# Patient Record
Sex: FEMALE | Race: WHITE | Employment: UNEMPLOYED | URBAN - METROPOLITAN AREA
[De-identification: names, ages, dates, MRNs, and addresses within clinical notes are randomized per-mention and may not be internally consistent; named-entity substitution may affect disease eponyms.]

---

## 2021-02-15 ENCOUNTER — OFFICE VISIT (OUTPATIENT)
Dept: PRIMARY CARE CLINIC | Age: 51
End: 2021-02-15
Payer: MEDICAID

## 2021-02-15 VITALS
SYSTOLIC BLOOD PRESSURE: 105 MMHG | HEIGHT: 68 IN | TEMPERATURE: 98 F | BODY MASS INDEX: 35.34 KG/M2 | OXYGEN SATURATION: 99 % | WEIGHT: 233.2 LBS | HEART RATE: 78 BPM | DIASTOLIC BLOOD PRESSURE: 71 MMHG | RESPIRATION RATE: 16 BRPM

## 2021-02-15 DIAGNOSIS — I10 ESSENTIAL HYPERTENSION: ICD-10-CM

## 2021-02-15 DIAGNOSIS — Z23 NEED FOR SHINGLES VACCINE: ICD-10-CM

## 2021-02-15 DIAGNOSIS — Z12.11 SCREEN FOR COLON CANCER: ICD-10-CM

## 2021-02-15 DIAGNOSIS — Z01.419 WOMEN'S ANNUAL ROUTINE GYNECOLOGICAL EXAMINATION: ICD-10-CM

## 2021-02-15 DIAGNOSIS — Z12.11 COLON CANCER SCREENING: ICD-10-CM

## 2021-02-15 DIAGNOSIS — F31.9 BIPOLAR DISORDER WITH DEPRESSION (HCC): ICD-10-CM

## 2021-02-15 DIAGNOSIS — K21.9 GASTROESOPHAGEAL REFLUX DISEASE WITHOUT ESOPHAGITIS: ICD-10-CM

## 2021-02-15 DIAGNOSIS — M05.79 RHEUMATOID ARTHRITIS INVOLVING MULTIPLE SITES WITH POSITIVE RHEUMATOID FACTOR (HCC): Primary | ICD-10-CM

## 2021-02-15 PROCEDURE — 99204 OFFICE O/P NEW MOD 45 MIN: CPT | Performed by: NURSE PRACTITIONER

## 2021-02-15 RX ORDER — HYDROCHLOROTHIAZIDE 50 MG/1
50 TABLET ORAL DAILY
COMMUNITY
End: 2021-03-30 | Stop reason: SDUPTHER

## 2021-02-15 RX ORDER — ZOSTER VACCINE RECOMBINANT, ADJUVANTED 50 MCG/0.5
KIT INTRAMUSCULAR
Qty: 0.5 ML | Refills: 0 | Status: SHIPPED | OUTPATIENT
Start: 2021-02-15 | End: 2021-07-01

## 2021-02-15 RX ORDER — MELOXICAM 15 MG/1
15 TABLET ORAL DAILY
COMMUNITY
End: 2021-06-28

## 2021-02-15 RX ORDER — ARIPIPRAZOLE 10 MG/1
10 TABLET ORAL DAILY
COMMUNITY
End: 2021-03-23 | Stop reason: SDUPTHER

## 2021-02-15 RX ORDER — FAMOTIDINE 20 MG/1
20 TABLET, FILM COATED ORAL 2 TIMES DAILY
COMMUNITY
End: 2021-04-26 | Stop reason: SDUPTHER

## 2021-02-15 RX ORDER — PANTOPRAZOLE SODIUM 40 MG/1
40 TABLET, DELAYED RELEASE ORAL DAILY
Qty: 90 TAB | Refills: 1 | Status: SHIPPED | OUTPATIENT
Start: 2021-02-15 | End: 2021-06-28

## 2021-02-15 RX ORDER — GABAPENTIN 600 MG/1
600 TABLET ORAL 3 TIMES DAILY
COMMUNITY
End: 2021-05-14 | Stop reason: SDUPTHER

## 2021-02-15 RX ORDER — VENLAFAXINE HYDROCHLORIDE 150 MG/1
150 TABLET, EXTENDED RELEASE ORAL DAILY
COMMUNITY
End: 2021-03-23 | Stop reason: SDUPTHER

## 2021-02-15 RX ORDER — LAMOTRIGINE 200 MG/1
TABLET ORAL DAILY
COMMUNITY
End: 2021-03-23 | Stop reason: SDUPTHER

## 2021-02-15 RX ORDER — DEXTROAMPHETAMINE SACCHARATE, AMPHETAMINE ASPARTATE, DEXTROAMPHETAMINE SULFATE AND AMPHETAMINE SULFATE 5; 5; 5; 5 MG/1; MG/1; MG/1; MG/1
30 TABLET ORAL 2 TIMES DAILY
COMMUNITY

## 2021-02-15 RX ORDER — TRAZODONE HYDROCHLORIDE 150 MG/1
150 TABLET ORAL
COMMUNITY
End: 2021-04-26 | Stop reason: SDUPTHER

## 2021-02-15 RX ORDER — LEFLUNOMIDE 20 MG/1
TABLET ORAL DAILY
COMMUNITY
End: 2021-02-16 | Stop reason: ALTCHOICE

## 2021-02-15 RX ORDER — PROPRANOLOL HYDROCHLORIDE 10 MG/1
10 TABLET ORAL 3 TIMES DAILY
COMMUNITY
End: 2021-04-26 | Stop reason: SDUPTHER

## 2021-02-15 RX ORDER — BUPROPION HYDROCHLORIDE 200 MG/1
200 TABLET, EXTENDED RELEASE ORAL 2 TIMES DAILY
COMMUNITY
End: 2021-03-23 | Stop reason: SDUPTHER

## 2021-02-15 RX ORDER — PANTOPRAZOLE SODIUM 40 MG/1
40 TABLET, DELAYED RELEASE ORAL DAILY
COMMUNITY
End: 2021-02-15 | Stop reason: SDUPTHER

## 2021-02-15 NOTE — PROGRESS NOTES
[facility]   [address] [facility], [address] [place] [address]  P: [phone]  F: [phone]      TERESSA Alicea is a 48 y.o. female who presents to clinic for Establish Care, will be obtaining prior records for full PMH, but briefly has a history of hypertension, rheumatoid arthritis, bipolar disorder with anxiety and panic attacks, PTSD. Presents today as she recently moved from [place] to [place]. She states she has an upcoming appointment with rheumatology Dr. Shari Solorzano, but would like to expedite that appointment as she continues to have significant joint pains to her hands, elbows, knees, and sometimes ankles. She had a rheumatologist in [place] that was about to start her on Evelia, she is interested in discussing that further. She failed treatment with methotrexate previously due to side effects. Of note, she does use marijuana which helps with her joint pains. For mental health, reports things are stable on current medication regimen. This includes Lamictal, Abilify, gabapentin, trazodone, Wellbutrin, and Effexor. She does not currently have a psychiatrist set up locally. Has a prior traumatic event of her  who committed suicide, which she mentions during her office visit today. For hypertension, continues on hydrochlorothiazide and propranolol. BP in office today is 105/71. There are no active problems to display for this patient.      Past Medical History:   Diagnosis Date    Anxiety     Bipolar affect, depressed ([address]     History of rheumatoid arthritis     PTSD (post-traumatic stress disorder)      Past Surgical History:   Procedure Laterality Date    HX CHOLECYSTECTOMY  2004    gallbladder taken out    HX GYN  1997    complete hysterectomy     Social History     Socioeconomic History    Marital status: SINGLE     Spouse name: Not on file    Number of children: Not on file    Years of education: Not on file    Highest education level: Not on file   Occupational History    Not on file   Social Needs    Financial resource strain: Not on file    Food insecurity     Worry: Not on file     Inability: Not on file    Transportation needs     Medical: Not on file     Non-medical: Not on file   Tobacco Use    Smoking status: Former Smoker     Quit date:      Years since quittin.1    Smokeless tobacco: Never Used   Substance and Sexual Activity    Alcohol use: Yes     Comment: occasionally    Drug use: Yes     Types: Marijuana     Comment: medical marijuana license in [place]/ helps with her hurting so back and panic attacks    Sexual activity: Not on file   Lifestyle    Physical activity     Days per week: Not on file     Minutes per session: Not on file    Stress: Not on file   Relationships    Social connections     Talks on phone: Not on file     Gets together: Not on file     Attends Synagogue service: Not on file     Active member of club or organization: Not on file     Attends meetings of clubs or organizations: Not on file     Relationship status: Not on file    Intimate partner violence     Fear of current or ex partner: Not on file     Emotionally abused: Not on file     Physically abused: Not on file     Forced sexual activity: Not on file   Other Topics Concern    Not on file   Social History Narrative    Not on file     Family History   Problem Relation Age of Onset    Osteoporosis Mother    [facility] Arthritis Mother     Dementia Mother     Psychiatric Disorder Mother     Diabetes Father     Heart Disease Father     Cancer Father     Other Father      Allergies   Allergen Reactions    Methotrexate Angioedema    Percocet [Oxycodone-Acetaminophen] Other (comments)     Gives her the \"creepy crawlies\"        Current Outpatient Medications   Medication Sig Dispense Refill    propranoloL (INDERAL) 10 mg tablet Take 10 mg by mouth three (3) times daily.       hydroCHLOROthiazide (HYDRODIURIL) 50 mg tablet Take 50 mg by mouth daily.      Venlafaxine-ER 24 HR (EFFEXOR-ER) 150 mg tr24 tablet Take 150 mg by mouth daily.  buPROPion SR (WELLBUTRIN, ZYBAN) 200 mg SR tablet Take 200 mg by mouth two (2) times a day.  meloxicam (MOBIC) 15 mg tablet Take 15 mg by mouth daily.  famotidine (PEPCID) 20 mg tablet Take 20 mg by mouth two (2) times a day.  vit J-A2-D0-folic-B12-ALA-Q10 15 UEAL-56 mg- 12.5 mg-1 mg cap Take 1 mg by mouth.  traZODone (DESYREL) 150 mg tablet Take 150 mg by mouth nightly.  gabapentin (NEURONTIN) 600 mg tablet Take 600 mg by mouth three (3) times daily.  ARIPiprazole (ABILIFY) 10 mg tablet Take 10 mg by mouth daily.  lamoTRIgine (LaMICtal) 200 mg tablet Take  by mouth daily.  dextroamphetamine-amphetamine (ADDERALL) 20 mg tablet Take 20 mg by mouth two (2) times a day.  pantoprazole (PROTONIX) 40 mg tablet Take 1 Tab by mouth daily. 90 Tab 1    varicella-zoster recombinant, PF, (Shingrix, PF,) 50 mcg/0.5 mL susr injection 1 shot IM now, 2nd shot in 2-6 months. 0.5 mL 0           The medications were reviewed and updated in the medical record. The past medical history, past surgical history, and family history were reviewed and updated in the medical record. REVIEW OF SYSTEMS   Review of Systems   Constitutional: Negative for malaise/fatigue. HENT: Negative for congestion. Eyes: Negative for blurred vision and pain. Respiratory: Negative for cough and shortness of breath. Cardiovascular: Negative for chest pain and palpitations. Gastrointestinal: Negative for abdominal pain and heartburn. Genitourinary: Negative for frequency and urgency. Musculoskeletal: Positive for joint pain. Negative for myalgias. Neurological: Negative for dizziness, tingling, sensory change, weakness and headaches. Psychiatric/Behavioral: Negative for depression, memory loss and substance abuse.          PHYSICAL EXAM     Visit Vitals  /71 (BP 1 Location: Left upper arm, BP Patient Position: Sitting, BP Cuff Size: Large adult)   Pulse 78   Temp 98 °F (36.7 °C) (Temporal)   Resp 16   Ht 5' 8\" (1.727 m)   Wt 233 lb 3.2 oz (105.8 kg)   SpO2 99%   BMI 35.46 kg/m²       Physical Exam  Vitals signs and nursing note reviewed. HENT:      Head: Normocephalic and atraumatic. Right Ear: External ear normal.      Left Ear: External ear normal.   Cardiovascular:      Rate and Rhythm: Normal rate and regular rhythm. Heart sounds: Normal heart sounds, S1 normal and S2 normal. No murmur. No friction rub. No gallop. Pulmonary:      Effort: Pulmonary effort is normal.      Breath sounds: Normal breath sounds. Musculoskeletal: Normal range of motion. Comments: + Multiple chronic joint pains- ankles, elbows, knees, hands    Skin:     General: Skin is warm and dry. Comments: + Multiple tattoos    Neurological:      Mental Status: She is alert and oriented to person, place, and time. Gait: Gait is intact. Psychiatric:         Mood and Affect: Affect is flat. Judgment: Judgment normal.         ASSESSMENT/ PLAN   Diagnoses and all orders for this visit:    1. Rheumatoid arthritis involving multiple sites with positive rheumatoid factor ([facility]  -     REFERRAL TO RHEUMATOLOGY  -Interested in [place], I will defer to rheumatology to further discuss. 2.  Essential hypertension  -BP stable in office, no changes today. Request recent lab work CMP and TSH. 3. Bipolar disorder with depression ([facility]       -Advised her to see what psychiatry resources are available to her through Medicaid. 4. Gastroesophageal reflux disease without esophagitis  -     pantoprazole (PROTONIX) 40 mg tablet; Take 1 Tab by mouth daily. 5. Screen for colon cancer  -     [facility] 3D MARLA W MAMMO BI SCREENING INCL CAD; Future    6. Women's annual routine gynecological examination  -     REFERRAL TO OBSTETRICS AND GYNECOLOGY    7. Colon cancer screening  -     REFERRAL TO GASTROENTEROLOGY    8. Need for shingles vaccine  -     varicella-zoster recombinant, PF, (Shingrix, PF,) 50 mcg/0.5 mL susr injection; 1 shot IM now, 2nd shot in 2-6 months. Disclaimer:  Advised patient to call back or return to office if symptoms worsen/change/persist.  Discussed expected course/resolution/complications of diagnosis in detail with patient.     Medication risks/benefits/alternatives discussed with patient. Patient was given an after visit summary which includes diagnoses, current medications, & vitals.      Discussed patient instructions and advised to read to all patient instructions regarding care.      Patient expressed understanding with the diagnosis and plan. This note will not be viewable in [address]         Maco Claros NP  2/15/2021        (This document has been electronically signed)

## 2021-02-15 NOTE — PROGRESS NOTES
She saw a Behavioral Specialist back in [place] and needs to re-establish care with someone here. Chief Complaint   Patient presents with   [facility] [place] [facility]     and patient recently moved here from [place], recently diagnosed with Rheumatoid Arthritis. 1. Have you been to the ER, urgent care clinic since your last visit? Hospitalized since your last visit? No    2. Have you seen or consulted any other health care providers outside of the [address] [place] [address] since your last visit? Include any pap smears or colon screening.  No

## 2021-02-16 ENCOUNTER — TELEPHONE (OUTPATIENT)
Dept: RHEUMATOLOGY | Age: 51
End: 2021-02-16

## 2021-02-16 NOTE — TELEPHONE ENCOUNTER
----- Message from Destiny Garcia MD sent at 2/16/2021  3:21 PM EST -----  Happy to take a look. Reid Harley, can we fit into a Tuesday afternoon within the next 2 weeks? Thanks,  Vivianne Spatz  ----- Message -----  From: Arlyn Andujar NP  Sent: 2/16/2021   8:28 AM EST  To: Destiny Garcia MD    Good morning,     I have a new patient with multiple joint complaints, known RA. Can you expedite her appointment if possible.  Thanks,     Willie Hallman

## 2021-02-23 ENCOUNTER — OFFICE VISIT (OUTPATIENT)
Dept: RHEUMATOLOGY | Age: 51
End: 2021-02-23
Payer: MEDICAID

## 2021-02-23 VITALS
HEIGHT: 68 IN | SYSTOLIC BLOOD PRESSURE: 120 MMHG | DIASTOLIC BLOOD PRESSURE: 69 MMHG | WEIGHT: 232.2 LBS | BODY MASS INDEX: 35.19 KG/M2 | TEMPERATURE: 97.7 F | OXYGEN SATURATION: 97 % | RESPIRATION RATE: 18 BRPM | HEART RATE: 74 BPM

## 2021-02-23 DIAGNOSIS — Z15.89 HLA B27 (HLA B27 POSITIVE): ICD-10-CM

## 2021-02-23 DIAGNOSIS — Z79.899 ONGOING USE OF POSSIBLY TOXIC MEDICATION: ICD-10-CM

## 2021-02-23 DIAGNOSIS — M47.816 LUMBAR FACET ARTHROPATHY: ICD-10-CM

## 2021-02-23 DIAGNOSIS — M06.00 SERONEGATIVE RHEUMATOID ARTHRITIS (HCC): Primary | ICD-10-CM

## 2021-02-23 PROCEDURE — 99205 OFFICE O/P NEW HI 60 MIN: CPT | Performed by: INTERNAL MEDICINE

## 2021-02-23 RX ORDER — FOLIC ACID 1 MG/1
TABLET ORAL DAILY
COMMUNITY
End: 2021-03-23 | Stop reason: SDUPTHER

## 2021-02-23 RX ORDER — LEFLUNOMIDE 20 MG/1
20 TABLET ORAL DAILY
Qty: 30 TAB | Refills: 5 | Status: SHIPPED | OUTPATIENT
Start: 2021-02-23 | End: 2022-01-10 | Stop reason: SDUPTHER

## 2021-02-23 RX ORDER — DICLOFENAC SODIUM 75 MG/1
75 TABLET, DELAYED RELEASE ORAL
Qty: 60 TAB | Refills: 3 | Status: SHIPPED | OUTPATIENT
Start: 2021-02-23 | End: 2021-03-25

## 2021-02-23 NOTE — PATIENT INSTRUCTIONS
1. Labs at your earliest convenience, any [facility] or [facility]. 2. Xrays, take the orders to [facility], you don't need an appointment for these. 3. Start leflunomide 1 pill daily. If you experience worsening tingling/numbness, let me know. 4. Talk to your PCP about restarting your B12--you may be able to just start taking a daily pill supplement now that you've had 3 months of injections. 5. Try diclofenac one pill up to twice a day as needed for joint pain. Don't take this on days you take meloxicam, as they work the same way. 6. You can also take up to 3000mg of tylenol in the day spaced out in doses <= 1000mg at a time for additional pain control. 7. Send me a message after you get the labs and xrays done, and we can decide if we need to follow up with a hand or pelvis MRI. 8. Return in 6 weeks.

## 2021-02-23 NOTE — PROGRESS NOTES
REASON FOR VISIT:   Lay Siddiqi is a 48 y.o. female with history of depression, PTSD, gout, and hypertension who is being referred to [address] [place] [address] Rheumatology at the request of Dr. Dori Carrero, regarding a diagnosis of joint pain. HISTORY OF PRESENT ILLNESS  History obtained from patient, review of PCP records, and review of Dr. Elza Guzman Rheumatology records. About 15 years ago, had an episode of acute onset right foot swelling and pain, couldn't walk or bear weight, lasted about 5 days. Uric acid she remembers being \"very high\", remembers taking allopurinol for about a year before she feels it fell off of her med list and was not continued. Recalls was drinking more regularly, and smoking then; she remains on hydrochlorothiazide. No acute flares since then. For over a year, she has had worsening hand pain. Also elbows, sometimes either knee. Swell and ache all day long. Pains are worst during the day while active, eg writing or typing. She says PCP in [place] found elevated RF (though normal from available notes), referred to Dr. Elieser Sanchez in Rheumatology but was only able to see him virtually twice, never in-person. Available labs showed +HLA-B27, negative RF and CCP. Started methotrexate around November 2021, developed lingual ulcers and tongue swelling, stopped after single dose and treated with leucovorin. Then prescribed leflunomide, but was as she was moving and her new insurance didn't accept the old prescription. She has had distal digital paresthesias worse in the morning for over a year. Initial B12 level July 2020 was 183 [], for which started monthly IM injections x 3 then was lost to followup with [place] PCP. Endorses about 1 hour morning stiffness, and prominent gelling. Joint pains are worse in the cold. Now, she takes meloxicam as needed for joint pain, which she says helps 50%.  Has been on gabapentin 600mg at night for 5-6 years which she mostly uses for mood and sleep. Pain in the hands often wakes her up, estimates she gets 5 hours of sleep per night. Chronic low back and left > right hip pain, no emmanuel radicular character. No rashes. No dyspnea on exertion, no chronic cough. Vision is gradually worsening, but no extended redness or pain. REVIEW OF SYSTEMS  A comprehensive review of systems was negative except for that written in the HPI. A 10-point review of systems is per the new patient questionnaire, which has been reviewed extensively and scanned into the electronic medical record for future reference. Review of systems is as above and is otherwise negative. ALLERGIES  Methotrexate and Percocet [oxycodone-acetaminophen]    MEDICATIONS  Current Outpatient Medications   Medication Sig    folic acid (FOLVITE) 1 mg tablet Take  by mouth daily.  propranoloL (INDERAL) 10 mg tablet Take 10 mg by mouth three (3) times daily.  hydroCHLOROthiazide (HYDRODIURIL) 50 mg tablet Take 50 mg by mouth daily.  Venlafaxine-ER 24 HR (EFFEXOR-ER) 150 mg tr24 tablet Take 150 mg by mouth daily.  buPROPion SR (WELLBUTRIN, ZYBAN) 200 mg SR tablet Take 200 mg by mouth two (2) times a day.  meloxicam (MOBIC) 15 mg tablet Take 15 mg by mouth daily.  famotidine (PEPCID) 20 mg tablet Take 20 mg by mouth two (2) times a day.  traZODone (DESYREL) 150 mg tablet Take 150 mg by mouth nightly.  gabapentin (NEURONTIN) 600 mg tablet Take 600 mg by mouth three (3) times daily.  ARIPiprazole (ABILIFY) 10 mg tablet Take 10 mg by mouth daily.  lamoTRIgine (LaMICtal) 200 mg tablet Take  by mouth daily.  dextroamphetamine-amphetamine (ADDERALL) 20 mg tablet Take 20 mg by mouth two (2) times a day.  pantoprazole (PROTONIX) 40 mg tablet Take 1 Tab by mouth daily.  vit M-W9-G2-folic-B12-ALA-Q10 15 LVHM-84 mg- 12.5 mg-1 mg cap Take 1 mg by mouth.     varicella-zoster recombinant, PF, (Shingrix, PF,) 50 mcg/0.5 mL susr injection 1 shot IM now, 2nd shot in 2-6 months. No current facility-administered medications for this visit. PAST MEDICAL HISTORY  Past Medical History:   Diagnosis Date    Anxiety     Bipolar affect, depressed ([address]     History of rheumatoid arthritis     PTSD (post-traumatic stress disorder)        FAMILY HISTORY  family history includes Arthritis in her mother; Arthritis-rheumatoid in her maternal aunt, mother, and sister; Cancer in her father; Dementia in her mother; Diabetes in her father; Gout in her maternal grandmother; Heart Disease in her father; Lupus in her maternal uncle; Osteoporosis in her mother; Other in her father; Psychiatric Disorder in her mother. SOCIAL HISTORY  She  reports that she quit smoking about 13 months ago. She has never used smokeless tobacco. She reports current alcohol use. She reports current drug use. Drug: Marijuana. 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 Born and raised in [place]. Son lives in [place]. Drinks a glass of wine once or twice a month. Quit smoking after 1/2 PPD x35yr. DATA  Visit Vitals  Temp 97.7 °F (36.5 °C)   Ht 5' 8\" (1.727 m)   Wt 232 lb 3.2 oz (105.3 kg)   BMI 35.31 kg/m²    Body mass index is 35.31 kg/m². No flowsheet data found. Joint Count 2/23/2021   MHAQ 0.75   Left wrist- Tender 1   Left wrist- Swollen 0   Left 1st MCP - Tender 1   Left 1st MCP - Swollen 0   Left knee - Tender 1   Left knee - Swollen 0   Right 2nd PIP - Tender 1   Right 2nd PIP - Swollen 0   Tender Joint Count (Total) 4   Swollen Joint Count (Total) 0   Physician Assessment (0-10) 2   Patient Assessment (0-10) 6   CDAI Total (calculated) 12       General:  The patient is pleasant, obese, well nourished, alert, down-appearing, but in no apparent distress. Eyes: Sclera are anicteric. No conjunctival injection. HEENT:  Oropharynx is clear. No oral ulcers.  Adequate salivary pooling. No cervical or supraclavicular lymphadenopathy. Lungs:  Clear to auscultation bilaterally, without wheeze or stridor. Normal respiratory effort. Cor:  Regular rate and rhythm. No murmurs rubs or gallops. Abdomen: Sities: No calf tenderness or edema. Warm and well perfused. Skin:  No significant abnormalitiesoft, non-tender, without hepatomegaly or masses. Extrem. No petechial, purpuric, or psoriaform rashes. Normal nails. No sclerodactyly. Neuro: Nonfocal, no foot or wrist drop. Negative SLR bilaterally. Musculoskeletal:    A comprehensive musculoskeletal exam was performed for all joints of each upper and lower extremity and assessed for swelling, tenderness and range of motion. Results are documented as below:  Left wrist, left 1st MCP right index PIP tender without marked synovitis. Mild extensor retinaculum prominence over right mid-foot, no synovitis. No evidence of synovitis in the shoulders, elbows, hips, knees or ankles. mShober 15->20cm    Labs:  12/17/20: ESR 19, CMP WNL (Cr 098), CBC WNL, uric acid 7.0  7/8/20: Uric acid 7.5, CCP negative, RF negative, B12 183, SHERON negative, HLA-B27+        Imaging:  None available      ASSESSMENT AND PLAN  Ms. Quirino Nicolas is a 48 y.o. female who presents for evaluation of HLA-B27+ seronegative rheumatoid arthritis with inflammatory-character athralgias and by CDAI moderate disease activity. Intolerance/allergy to methotrexate, reasonable to pursue leflunomide next given nationwide sulfasalazine shortage. Her low back pain has more degenerative character but will screen also for e/o ankylosing spondylitis given HLA-B27 positivity. Reviewed risk/benefit of further prednisone and she prefers to continue with prn NSAIDs while starting leflunomide. 1. Seronegative rheumatoid arthritis (HCC)  - C REACTIVE PROTEIN, QT; Standing  - CBC WITH AUTOMATED DIFF; Standing  - METABOLIC PANEL, COMPREHENSIVE; Standing  - SED RATE (ESR);  Standing  - CHRONIC HEPATITIS PANEL; Future  - QUANTIFERON-TB GOLD PLUS; Future  - XR HAND RT MIN 3 V; Future  - XR HAND LT MIN 3 V; Future  - XR KNEE RT 3 V; Future  - XR KNEE LT 3 V; Future  - XR ELBOW LT MIN 3 V; Future  - XR ELBOW RT MIN 3 V; Future  - XR FOOT RT MIN 3 V; Future  - XR FOOT LT MIN 3 V; Future  - XR SPINE LUMB MIN 4 V; Future  - XR HIPS BI W PELV 3 OR 4 VWS; Future  - diclofenac EC (VOLTAREN) 75 mg EC tablet; Take 1 Tab by mouth two (2) times daily as needed for Pain for up to 30 days. Dispense: 60 Tab; Refill: 3  - leflunomide (ARAVA) 20 mg tablet; Take 1 Tab by mouth daily. Take half tab daily for 7 days and then one tab daily if tolerated  Dispense: 30 Tab; Refill: 5    2. Ongoing use of possibly toxic medication  - C REACTIVE PROTEIN, QT; Standing  - CBC WITH AUTOMATED DIFF; Standing  - METABOLIC PANEL, COMPREHENSIVE; Standing  - SED RATE (ESR); Standing  - CHRONIC HEPATITIS PANEL; Future  - QUANTIFERON-TB GOLD PLUS; Future    3. HLA B27 (HLA B27 positive)  - XR HAND RT MIN 3 V; Future  - XR HAND LT MIN 3 V; Future  - XR KNEE RT 3 V; Future  - XR KNEE LT 3 V; Future  - XR ELBOW LT MIN 3 V; Future  - XR ELBOW RT MIN 3 V; Future  - XR FOOT RT MIN 3 V; Future  - XR FOOT LT MIN 3 V; Future  - XR SPINE LUMB MIN 4 V; Future  - XR HIPS BI W PELV 3 OR 4 VWS; Future    4. Lumbar facet arthropathy  - XR SPINE LUMB MIN 4 V; Future  - XR HIPS BI W PELV 3 OR 4 VWS; Future  - diclofenac EC (VOLTAREN) 75 mg EC tablet; Take 1 Tab by mouth two (2) times daily as needed for Pain for up to 30 days. Dispense: 60 Tab; Refill: 3  - leflunomide (ARAVA) 20 mg tablet; Take 1 Tab by mouth daily. Take half tab daily for 7 days and then one tab daily if tolerated  Dispense: 30 Tab; Refill: 5    5.  Vitamin B12 deficiency  -Encouraged her to review starting oral B12 supplementation with her PCP      Orders Placed This Encounter    QUANTIFERON-TB GOLD PLUS    XR HAND RT MIN 3 V    XR HAND LT MIN 3 V    XR KNEE RT 3 V    XR KNEE LT 3 V  XR ELBOW LT MIN 3 V    XR ELBOW RT MIN 3 V    XR FOOT RT MIN 3 V    XR FOOT LT MIN 3 V    XR SPINE LUMB MIN 4 V    XR HIPS BI W PELV 3 OR 4 VWS    C REACTIVE PROTEIN, QT    CBC WITH AUTOMATED DIFF    METABOLIC PANEL, COMPREHENSIVE    SED RATE (ESR)    CHRONIC HEPATITIS PANEL    folic acid (FOLVITE) 1 mg tablet    diclofenac EC (VOLTAREN) 75 mg EC tablet    leflunomide (ARAVA) 20 mg tablet       Medications: I am having Kimberlyn Yo start on diclofenac EC and leflunomide. I am also having her maintain her propranoloL, hydroCHLOROthiazide, Venlafaxine-ER 24 HR, buPROPion SR, meloxicam, famotidine, vit E-D0-O4-folic-B12-ALA-Q10, traZODone, gabapentin, ARIPiprazole, lamoTRIgine, dextroamphetamine-amphetamine, pantoprazole, Shingrix (PF), and folic acid. Follow up: Return in about 6 weeks (around 4/6/2021).      Time in: 1:23  Time out: 2:25  Chart review and note preparation day of visit: 10 minutes  Total physician time day of service: 68 minutes    Angel Medina MD    Adult Rheumatology   [address], [address] [place] [address]   Phone [phone]  Fax [phone]

## 2021-02-23 NOTE — LETTER
2/23/2021 Patient: Remy Roldan YOB: 1970 Date of Visit: 2/23/2021 Jene Cooks, NP 
[address] [place] [address] [address] [facility] [address] [id number] [address] [employer] Dear Jene Cooks, NP, Thank you for referring Ms. Remy Roldan to [address] [place] [address] for evaluation. My notes for this consultation are attached. If you have questions, please do not hesitate to call me. I look forward to following your patient along with you.  
 
 
Sincerely, 
 
Jenn Linn MD 
 
 Awake

## 2021-03-04 ENCOUNTER — HOSPITAL ENCOUNTER (OUTPATIENT)
Dept: GENERAL RADIOLOGY | Age: 51
Discharge: HOME | End: 2021-03-04
Payer: COMMERCIAL

## 2021-03-04 DIAGNOSIS — M06.00 SERONEGATIVE RHEUMATOID ARTHRITIS (HCC): ICD-10-CM

## 2021-03-04 DIAGNOSIS — Z15.89 HLA B27 (HLA B27 POSITIVE): ICD-10-CM

## 2021-03-04 DIAGNOSIS — M47.816 LUMBAR FACET ARTHROPATHY: ICD-10-CM

## 2021-03-04 DIAGNOSIS — E53.8 B12 DEFICIENCY: Primary | ICD-10-CM

## 2021-03-04 PROCEDURE — 73562 X-RAY EXAM OF KNEE 3: CPT

## 2021-03-04 PROCEDURE — 73080 X-RAY EXAM OF ELBOW: CPT

## 2021-03-04 PROCEDURE — 73522 X-RAY EXAM HIPS BI 3-4 VIEWS: CPT

## 2021-03-04 PROCEDURE — 73130 X-RAY EXAM OF HAND: CPT

## 2021-03-04 PROCEDURE — 73630 X-RAY EXAM OF FOOT: CPT

## 2021-03-04 PROCEDURE — 72110 X-RAY EXAM L-2 SPINE 4/>VWS: CPT

## 2021-03-07 LAB
ALBUMIN SERPL-MCNC: 3.7 G/DL (ref 3.8–4.8)
ALBUMIN/GLOB SERPL: 1.5 {RATIO} (ref 1.2–2.2)
ALP SERPL-CCNC: 96 IU/L (ref 39–117)
ALT SERPL-CCNC: 11 IU/L (ref 0–32)
AST SERPL-CCNC: 10 IU/L (ref 0–40)
BASOPHILS # BLD AUTO: 0.1 X10E3/UL (ref 0–0.2)
BASOPHILS NFR BLD AUTO: 1 %
BILIRUB SERPL-MCNC: <0.2 MG/DL (ref 0–1.2)
BUN SERPL-MCNC: 17 MG/DL (ref 6–24)
BUN/CREAT SERPL: 28 (ref 9–23)
CALCIUM SERPL-MCNC: 8.9 MG/DL (ref 8.7–10.2)
CHLORIDE SERPL-SCNC: 103 MMOL/L (ref 96–106)
CO2 SERPL-SCNC: 25 MMOL/L (ref 20–29)
COMMENT, 144067: NORMAL
CREAT SERPL-MCNC: 0.6 MG/DL (ref 0.57–1)
CRP SERPL-MCNC: 10 MG/L (ref 0–10)
EOSINOPHIL # BLD AUTO: 0.2 X10E3/UL (ref 0–0.4)
EOSINOPHIL NFR BLD AUTO: 3 %
ERYTHROCYTE [DISTWIDTH] IN BLOOD BY AUTOMATED COUNT: 12.4 % (ref 11.7–15.4)
ERYTHROCYTE [SEDIMENTATION RATE] IN BLOOD BY WESTERGREN METHOD: 18 MM/HR (ref 0–40)
GAMMA INTERFERON BACKGROUND BLD IA-ACNC: 0.03 IU/ML
GLOBULIN SER CALC-MCNC: 2.4 G/DL (ref 1.5–4.5)
GLUCOSE SERPL-MCNC: 144 MG/DL (ref 65–99)
HBV CORE AB SERPL QL IA: NEGATIVE
HBV CORE IGM SERPL QL IA: NEGATIVE
HBV E AB SERPL QL IA: NEGATIVE
HBV E AG SERPL QL IA: NEGATIVE
HBV SURFACE AB SER QL: REACTIVE
HBV SURFACE AG SERPL QL IA: NEGATIVE
HCT VFR BLD AUTO: 39 % (ref 34–46.6)
HCV AB S/CO SERPL IA: <0.1 S/CO RATIO (ref 0–0.9)
HGB BLD-MCNC: 12.6 G/DL (ref 11.1–15.9)
IMM GRANULOCYTES # BLD AUTO: 0 X10E3/UL (ref 0–0.1)
IMM GRANULOCYTES NFR BLD AUTO: 0 %
LYMPHOCYTES # BLD AUTO: 1.5 X10E3/UL (ref 0.7–3.1)
LYMPHOCYTES NFR BLD AUTO: 23 %
M TB IFN-G BLD-IMP: NEGATIVE
M TB IFN-G CD4+ BCKGRND COR BLD-ACNC: 0.02 IU/ML
MCH RBC QN AUTO: 29 PG (ref 26.6–33)
MCHC RBC AUTO-ENTMCNC: 32.3 G/DL (ref 31.5–35.7)
MCV RBC AUTO: 90 FL (ref 79–97)
MITOGEN IGNF BLD-ACNC: >10 IU/ML
MONOCYTES # BLD AUTO: 0.4 X10E3/UL (ref 0.1–0.9)
MONOCYTES NFR BLD AUTO: 6 %
NEUTROPHILS # BLD AUTO: 4.2 X10E3/UL (ref 1.4–7)
NEUTROPHILS NFR BLD AUTO: 67 %
PLATELET # BLD AUTO: 301 X10E3/UL (ref 150–450)
POTASSIUM SERPL-SCNC: 4.1 MMOL/L (ref 3.5–5.2)
PROT SERPL-MCNC: 6.1 G/DL (ref 6–8.5)
QUANTIFERON INCUBATION, QF1T: NORMAL
QUANTIFERON TB2 AG: 0.02 IU/ML
RBC # BLD AUTO: 4.34 X10E6/UL (ref 3.77–5.28)
SERVICE CMNT-IMP: NORMAL
SODIUM SERPL-SCNC: 139 MMOL/L (ref 134–144)
WBC # BLD AUTO: 6.3 X10E3/UL (ref 3.4–10.8)

## 2021-03-26 ENCOUNTER — TELEPHONE (OUTPATIENT)
Dept: RHEUMATOLOGY | Age: 51
End: 2021-03-26

## 2021-03-26 NOTE — TELEPHONE ENCOUNTER
----- Message from Pinky Nation LPN sent at 2/61/2009  1:43 PM EDT -----  Regarding: FW: Non-Urgent Medical Question  Contact: [phone]    ----- Message -----  From: Kera Patel  Sent: 3/26/2021   1:20 PM EDT  To: [facility] Nurse Pool  Subject: Non-Urgent Medical Question                      I'm unable to have my apt on the 30th can I please reschedule

## 2021-03-26 NOTE — TELEPHONE ENCOUNTER
Return call to patient due to a my chart message stating patient is unable to make her March the 30th appt left message on voice mail for pt to call back into the office to reschedule sdh

## 2021-03-30 DIAGNOSIS — I10 ESSENTIAL HYPERTENSION: Primary | ICD-10-CM

## 2021-03-30 RX ORDER — HYDROCHLOROTHIAZIDE 50 MG/1
50 TABLET ORAL DAILY
Qty: 90 TAB | Refills: 1 | Status: SHIPPED | OUTPATIENT
Start: 2021-03-30 | End: 2022-01-04 | Stop reason: ALTCHOICE

## 2021-04-07 ENCOUNTER — TELEPHONE (OUTPATIENT)
Dept: RHEUMATOLOGY | Age: 51
End: 2021-04-07

## 2021-04-07 ENCOUNTER — OFFICE VISIT (OUTPATIENT)
Dept: RHEUMATOLOGY | Age: 51
End: 2021-04-07
Payer: COMMERCIAL

## 2021-04-07 VITALS
TEMPERATURE: 97.1 F | DIASTOLIC BLOOD PRESSURE: 67 MMHG | WEIGHT: 232.8 LBS | HEIGHT: 68 IN | OXYGEN SATURATION: 96 % | HEART RATE: 74 BPM | RESPIRATION RATE: 20 BRPM | BODY MASS INDEX: 35.28 KG/M2 | SYSTOLIC BLOOD PRESSURE: 121 MMHG

## 2021-04-07 DIAGNOSIS — Z79.899 ONGOING USE OF POSSIBLY TOXIC MEDICATION: ICD-10-CM

## 2021-04-07 DIAGNOSIS — M13.80 SERONEGATIVE ARTHRITIS: Primary | ICD-10-CM

## 2021-04-07 PROCEDURE — 99214 OFFICE O/P EST MOD 30 MIN: CPT | Performed by: INTERNAL MEDICINE

## 2021-04-07 NOTE — PATIENT INSTRUCTIONS
1. Joints look good today. The xrays suggest no advanced bony damage; you do have some narrowing in your low back. 2. Continue leflunomide daily and diclofenac twice a day as needed. You're welcome to overlap the diclofenac with tylenol (650-1000mg up to 3x/day) to help reduce the diclofenac dependence. 3. Repeat labs before next visit. 4. Return in 3 months.

## 2021-04-07 NOTE — PROGRESS NOTES
REASON FOR VISIT:   Dayami Bronson is a 48 y.o. female with history of depression, PTSD, non-crystal proven gout, and hypertension who is returning for followup of HLA-B27+ seronegative rheumatoid arthritis. HISTORY OF PRESENT ILLNESS    Overall feeling improved. Only 8 nights of hands     Elbows, ankles, and knees still aching to lesser extent. Still having to avoid sweeping motions and vigorous activity around the house. Stopped the meloxicam, likes the diclofenac. Still needs the diclofenac twice a day every day, to stay active. No new rashes. No breathing complaints. No interval eye redness or pain. REVIEW OF SYSTEMS  A comprehensive review of systems was negative except for that written in the HPI. A 10-point review of systems is per the new patient questionnaire, which has been reviewed extensively and scanned into the electronic medical record for future reference. Review of systems is as above and is otherwise negative. ALLERGIES  Methotrexate and Percocet [oxycodone-acetaminophen]    MEDICATIONS  Current Outpatient Medications   Medication Sig    hydroCHLOROthiazide (HYDRODIURIL) 50 mg tablet Take 1 Tab by mouth daily.  lamoTRIgine (LaMICtal) 200 mg tablet Take 1 Tab by mouth daily for 92 days. Take  by mouth daily.  ARIPiprazole (ABILIFY) 10 mg tablet Take 1 Tab by mouth daily. Take 10 mg by mouth daily.  folic acid (FOLVITE) 1 mg tablet Take 1 Tab by mouth daily. Take  by mouth daily.  buPROPion SR (WELLBUTRIN, ZYBAN) 200 mg SR tablet Take 1 Tab by mouth two (2) times a day for 92 days. Take 200 mg by mouth two (2) times a day.  Venlafaxine-ER 24 HR (EFFEXOR-ER) 150 mg tr24 tablet Take 1 Tab by mouth daily for 92 days. Take 150 mg by mouth daily.  leflunomide (ARAVA) 20 mg tablet Take 1 Tab by mouth daily. Take half tab daily for 7 days and then one tab daily if tolerated    propranoloL (INDERAL) 10 mg tablet Take 10 mg by mouth three (3) times daily.     famotidine (PEPCID) 20 mg tablet Take 20 mg by mouth two (2) times a day.  vit A-R7-C4-folic-B12-ALA-Q10 15 SLEH-40 mg- 12.5 mg-1 mg cap Take 1 mg by mouth.  traZODone (DESYREL) 150 mg tablet Take 150 mg by mouth nightly.  gabapentin (NEURONTIN) 600 mg tablet Take 600 mg by mouth three (3) times daily.  dextroamphetamine-amphetamine (ADDERALL) 20 mg tablet Take 20 mg by mouth two (2) times a day.  pantoprazole (PROTONIX) 40 mg tablet Take 1 Tab by mouth daily.  meloxicam (MOBIC) 15 mg tablet Take 15 mg by mouth daily.  varicella-zoster recombinant, PF, (Shingrix, PF,) 50 mcg/0.5 mL susr injection 1 shot IM now, 2nd shot in 2-6 months. No current facility-administered medications for this visit. PAST MEDICAL HISTORY  Past Medical History:   Diagnosis Date    Anxiety     Bipolar affect, depressed ([facility] [address]     History of rheumatoid arthritis     PTSD (post-traumatic stress disorder)        FAMILY HISTORY  family history includes Arthritis in her mother; Arthritis-rheumatoid in her mother, paternal aunt, and sister; Cancer in her father; Dementia in her mother; Diabetes in her father; Gout in her maternal grandmother; Heart Disease in her father; Lupus in her paternal uncle; Osteoporosis in her mother; Other in her father; Psychiatric Disorder in her mother. SOCIAL HISTORY  She  reports that she quit smoking about 15 months ago. She has never used smokeless tobacco. She reports current alcohol use. She reports current drug use. Drug: Marijuana. 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 Born and raised in [place]. Son lives in [place]. Drinks a glass of wine once or twice a month. Quit smoking after 1/2 PPD x35yr.        DATA  Visit Vitals  /67 (BP 1 Location: Left upper arm, BP Patient Position: Sitting)   Pulse 74   Temp 97.1 °F (36.2 °C) (Oral)   Resp 20   Ht 5' 8\" (1.727 m)   Wt 232 lb 12.8 oz (105.6 kg)   SpO2 96%   BMI 35.40 kg/m²    Body mass index is 35.4 kg/m². No flowsheet data found. Joint Count 2/23/2021   MHAQ 0.75   Left wrist- Tender 1   Left wrist- Swollen 0   Left 1st MCP - Tender 1   Left 1st MCP - Swollen 0   Left knee - Tender 1   Left knee - Swollen 0   Right 2nd PIP - Tender 1   Right 2nd PIP - Swollen 0   Tender Joint Count (Total) 4   Swollen Joint Count (Total) 0   Physician Assessment (0-10) 2   Patient Assessment (0-10) 6   CDAI Total (calculated) 12       General:  The patient is pleasant, obese, well nourished, alert, down-appearing, but in no apparent distress. Eyes: Sclera are anicteric. No conjunctival injection. HEENT:  Oropharynx is clear. No oral ulcers. Adequate salivary pooling. No cervical or supraclavicular lymphadenopathy. Lungs:  Clear to auscultation bilaterally, without wheeze or stridor. Normal respiratory effort. Cor:  Regular rate and rhythm. No murmurs rubs or gallops. Abdomen: Sities: No calf tenderness or edema. Warm and well perfused. Skin:  No significant abnormalitiesoft, non-tender, without hepatomegaly or masses. Extrem. No petechial, purpuric, or psoriaform rashes. Normal nails. No sclerodactyly. Neuro: Nonfocal, no foot or wrist drop. Negative SLR bilaterally. Musculoskeletal:    A comprehensive musculoskeletal exam was performed for all joints of each upper and lower extremity and assessed for swelling, tenderness and range of motion. Results are documented as below:  Interval resolved tenderness of left wrist, left 1st MCP, right index PIP. Negative MTP squeeze tenderness  No evidence of synovitis in the shoulders, elbows, hips, knees or ankles.    mShober 15->20cm last visit, not repeated today    Labs:  12/17/20: ESR 19, CMP WNL (Cr 098), CBC WNL, uric acid 7.0  7/8/20: Uric acid 7.5, CCP negative, RF negative, B12 183, SHERON negative, HLA-B27+    Imaging:  3/4/21 Lspine xray: Prominent chronic disc degeneration L5-S1 with some posterior element hypertrophy. 3/4/21 XR hands, feet, knees, pelvis: No bony erosions, periarticular osteopenia, or chondrocalcinosis. Frankie Maria  Ms. Laurel Seals is a 48 y.o. female who presents for evaluation of HLA-B27+ seronegative rheumatoid arthritis with low disease activity by CDAI on leflunomide. Continuing symptomatic management of breakthrough pain with diclofenac and tylenol as needed. Reinforced importance of starting B12 supplements asap    1. Seronegative arthritis  - C REACTIVE PROTEIN, QT; Future  - CBC WITH AUTOMATED DIFF; Future  - METABOLIC PANEL, COMPREHENSIVE; Future  - SED RATE (ESR); Future    2. Ongoing use of possibly toxic medication  - CBC WITH AUTOMATED DIFF; Future  - METABOLIC PANEL, COMPREHENSIVE; Future    Patient Instructions   1. Joints look good today. The xrays suggest no advanced bony damage; you do have some narrowing in your low back. 2. Continue leflunomide daily and diclofenac twice a day as needed. You're welcome to overlap the diclofenac with tylenol (650-1000mg up to 3x/day) to help reduce the diclofenac dependence. 3. Repeat labs before next visit. 4. Return in 3 months. Orders Placed This Encounter    C REACTIVE PROTEIN, QT    CBC WITH AUTOMATED DIFF    METABOLIC PANEL, COMPREHENSIVE    SED RATE (ESR)       Medications: I am having Yves Gonzáles maintain her propranoloL, meloxicam, famotidine, vit H-K3-U9-folic-B12-ALA-Q10, traZODone, gabapentin, dextroamphetamine-amphetamine, pantoprazole, Shingrix (PF), leflunomide, lamoTRIgine, ARIPiprazole, folic acid, buPROPion SR, Venlafaxine-ER 24 HR, and hydroCHLOROthiazide. Follow up: Return in about 3 months (around 7/7/2021).      Face to face time: 25 minutes  Note preparation and records review day of service: 10 minutes  Total provider time day of service: 35 minutes      Tran Palacios MD    Adult Rheumatology   [address]   Phone [phone]  Fax [phone]

## 2021-04-07 NOTE — PROGRESS NOTES
Chief Complaint   Patient presents with    Arthritis     hands     1. Have you been to the ER, urgent care clinic since your last visit? Hospitalized since your last visit? No    2. Have you seen or consulted any other health care providers outside of the [address] [place] [address] since your last visit? Include any pap smears or colon screening.  No

## 2021-04-26 ENCOUNTER — OFFICE VISIT (OUTPATIENT)
Dept: PRIMARY CARE CLINIC | Age: 51
End: 2021-04-26
Payer: COMMERCIAL

## 2021-04-26 VITALS
RESPIRATION RATE: 14 BRPM | TEMPERATURE: 98.4 F | OXYGEN SATURATION: 97 % | BODY MASS INDEX: 36.62 KG/M2 | WEIGHT: 241.6 LBS | SYSTOLIC BLOOD PRESSURE: 133 MMHG | HEIGHT: 68 IN | DIASTOLIC BLOOD PRESSURE: 84 MMHG | HEART RATE: 86 BPM

## 2021-04-26 DIAGNOSIS — I10 ESSENTIAL HYPERTENSION: ICD-10-CM

## 2021-04-26 DIAGNOSIS — D64.9 ANEMIA, UNSPECIFIED TYPE: ICD-10-CM

## 2021-04-26 DIAGNOSIS — Z76.0 MEDICATION REFILL: ICD-10-CM

## 2021-04-26 DIAGNOSIS — E53.8 B12 DEFICIENCY: Primary | ICD-10-CM

## 2021-04-26 PROCEDURE — 96372 THER/PROPH/DIAG INJ SC/IM: CPT | Performed by: NURSE PRACTITIONER

## 2021-04-26 PROCEDURE — 99213 OFFICE O/P EST LOW 20 MIN: CPT | Performed by: NURSE PRACTITIONER

## 2021-04-26 RX ORDER — LEFLUNOMIDE 20 MG/1
20 TABLET ORAL DAILY
Qty: 30 TAB | Refills: 5 | Status: CANCELLED | OUTPATIENT
Start: 2021-04-26

## 2021-04-26 RX ORDER — LAMOTRIGINE 200 MG/1
200 TABLET ORAL DAILY
Qty: 90 TAB | Refills: 0 | Status: SHIPPED | OUTPATIENT
Start: 2021-04-26 | End: 2021-07-25

## 2021-04-26 RX ORDER — ARIPIPRAZOLE 10 MG/1
10 TABLET ORAL DAILY
Qty: 90 TAB | Refills: 0 | Status: SHIPPED | OUTPATIENT
Start: 2021-04-26 | End: 2022-01-04 | Stop reason: ALTCHOICE

## 2021-04-26 RX ORDER — VENLAFAXINE HYDROCHLORIDE 150 MG/1
150 TABLET, EXTENDED RELEASE ORAL DAILY
Qty: 90 TAB | Refills: 0 | Status: SHIPPED | OUTPATIENT
Start: 2021-04-26 | End: 2021-04-30

## 2021-04-26 RX ORDER — FOLIC ACID 1 MG/1
1 TABLET ORAL DAILY
Qty: 30 TAB | Refills: 5 | Status: SHIPPED | OUTPATIENT
Start: 2021-04-26 | End: 2022-01-10

## 2021-04-26 RX ORDER — TRAZODONE HYDROCHLORIDE 150 MG/1
150 TABLET ORAL
Qty: 90 TAB | Refills: 0 | Status: SHIPPED | OUTPATIENT
Start: 2021-04-26

## 2021-04-26 RX ORDER — BUPROPION HYDROCHLORIDE 200 MG/1
200 TABLET, EXTENDED RELEASE ORAL 2 TIMES DAILY
Qty: 180 TAB | Refills: 0 | Status: SHIPPED | OUTPATIENT
Start: 2021-04-26

## 2021-04-26 RX ORDER — PROPRANOLOL HYDROCHLORIDE 10 MG/1
10 TABLET ORAL 3 TIMES DAILY
Qty: 270 TAB | Refills: 0 | Status: SHIPPED | OUTPATIENT
Start: 2021-04-26

## 2021-04-26 RX ORDER — CYANOCOBALAMIN 1000 UG/ML
1000 INJECTION, SOLUTION INTRAMUSCULAR; SUBCUTANEOUS ONCE
Status: COMPLETED | OUTPATIENT
Start: 2021-04-26 | End: 2021-04-26

## 2021-04-26 RX ORDER — HYDROCHLOROTHIAZIDE 50 MG/1
50 TABLET ORAL DAILY
Qty: 90 TAB | Refills: 1 | Status: CANCELLED | OUTPATIENT
Start: 2021-04-26

## 2021-04-26 RX ORDER — FAMOTIDINE 20 MG/1
20 TABLET, FILM COATED ORAL 2 TIMES DAILY
Qty: 180 TAB | Refills: 0 | Status: SHIPPED | OUTPATIENT
Start: 2021-04-26 | End: 2022-01-04

## 2021-04-26 RX ADMIN — CYANOCOBALAMIN 1000 MCG: 1000 INJECTION, SOLUTION INTRAMUSCULAR; SUBCUTANEOUS at 11:14

## 2021-04-26 NOTE — PROGRESS NOTES
Rosalie Phalen is a  48 y.o. female presents for visit. Chief Complaint   Patient presents with    Medication Refill    Injection B12     HPI  Established patient new to me presents for medication refills. She is established with rheumatology. She has multiple psychiatric conditions and is seeking to establish care with psychiatry. Generally feeling well. She is due for B12 injection for B 12 deficiencty. Former pcp in [place] planned for 4 weekly  B12 injections. Today in Injection # 2. Will schedule 2 additional weekly injections and then check her level. Review of Systems   Constitutional: Negative for chills, fever and malaise/fatigue. HENT: Negative for congestion and sore throat. Respiratory: Negative for cough and shortness of breath. Cardiovascular: Negative for chest pain. Gastrointestinal: Negative for diarrhea, heartburn and nausea. Musculoskeletal: Positive for back pain and joint pain. Negative for myalgias. Neurological: Negative for headaches. Psychiatric/Behavioral: Positive for depression (Bi-polar). Negative for suicidal ideas. The patient is nervous/anxious (improved with medication).          Past Medical History:   Diagnosis Date    Anxiety     [facility] affect, depressed ([facility] [address]     COVID-19 03/2021    History of rheumatoid arthritis     PTSD (post-traumatic stress disorder)      Past Surgical History:   Procedure Laterality Date    HX CHOLECYSTECTOMY  2004    gallbladder taken out    HX GYN  1997    complete hysterectomy       Social History     Socioeconomic History    Marital status: SINGLE     Spouse name: Not on file    Number of children: Not on file    Years of education: Not on file    Highest education level: Not on file   Occupational History    Not on file   Social Needs    Financial resource strain: Not on file    Food insecurity     Worry: Not on file     Inability: Not on file    Transportation needs     Medical: Not on file Non-medical: Not on file   Tobacco Use    Smoking status: Former Smoker     Quit date: 2020     Years since quittin.3    Smokeless tobacco: Never Used   Substance and Sexual Activity    Alcohol use: Yes     Comment: occasionally    Drug use: Yes     Types: Marijuana     Comment: medical marijuana license in [facility]/ helps with her hurting so back and panic attacks, needs [facility]     Sexual activity: Not on file   Lifestyle    Physical activity     Days per week: Not on file     Minutes per session: Not on file    Stress: Not on file   Relationships    Social connections     Talks on phone: Not on file     Gets together: Not on file     Attends Bahai service: Not on file     Active member of club or organization: Not on file     Attends meetings of clubs or organizations: Not on file     Relationship status: Not on file    Intimate partner violence     Fear of current or ex partner: Not on file     Emotionally abused: Not on file     Physically abused: Not on file     Forced sexual activity: Not on file   Other Topics Concern     Service Not Asked    Blood Transfusions Not Asked    Caffeine Concern Not Asked    Occupational Exposure Not Asked   Laverta Sergeant Hazards Not Asked    Sleep Concern Not Asked    Stress Concern Not Asked    Weight Concern Not Asked    Special Diet Not Asked    Back Care Not Asked    Exercise Not Asked    Bike Helmet Not Asked    [address] Not Asked    Self-Exams Not Asked   Social History Narrative    Not on file       Family History   Problem Relation Age of Onset    Osteoporosis Mother     Arthritis Mother     Dementia Mother     Psychiatric Disorder Mother    Adrien Carmen Mother     Diabetes Father     Heart Disease Father     Cancer Father     Other Father     Arthritis-rheumatoid Sister     Gout Maternal Grandmother     Arthritis-rheumatoid Paternal Aunt     Lupus Paternal Uncle       Prior to Admission medications    Medication Sig Start Date End Date Taking? Authorizing Provider   lamoTRIgine (LaMICtal) 200 mg tablet Take 1 Tab by mouth daily for 90 days. Take  by mouth daily. 4/26/21 7/25/21 Yes Anthony Blanco NP   ARIPiprazole (ABILIFY) 10 mg tablet Take 1 Tab by mouth daily. Take 10 mg by mouth daily. 4/26/21  Yes Anthony Blanco NP   folic acid (FOLVITE) 1 mg tablet Take 1 Tab by mouth daily. Take  by mouth daily. 4/26/21  Yes Anthony Blanco NP   buPROPion SR (WELLBUTRIN, ZYBAN) 200 mg SR tablet Take 1 Tab by mouth two (2) times a day. Take 200 mg by mouth two (2) times a day. 4/26/21  Yes Anthony Blanco NP   propranoloL (INDERAL) 10 mg tablet Take 1 Tab by mouth three (3) times daily. 4/26/21  Yes Anthony Blanco NP   famotidine (PEPCID) 20 mg tablet Take 1 Tab by mouth two (2) times a day. 4/26/21  Yes Anthony Blanco NP   traZODone (DESYREL) 150 mg tablet Take 1 Tab by mouth nightly. 4/26/21  Yes Anthony Blanco NP   Venlafaxine-ER 24 HR (EFFEXOR-ER) 150 mg tr24 tablet Take 1 Tab by mouth daily for 90 days. Take 150 mg by mouth daily. 4/26/21 7/25/21 Yes Anthony Blanco NP   hydroCHLOROthiazide (HYDRODIURIL) 50 mg tablet Take 1 Tab by mouth daily. 3/30/21  Yes Ange Pendleton NP   leflunomide (ARAVA) 20 mg tablet Take 1 Tab by mouth daily. Take half tab daily for 7 days and then one tab daily if tolerated 2/23/21  Yes Hattie Rosario MD   vit S-C0-A3-folic-B12-ALA-Q10 15 TBNZ-00 mg- 12.5 mg-1 mg cap Take 1 mg by mouth. Yes Provider, Historical   gabapentin (NEURONTIN) 600 mg tablet Take 600 mg by mouth three (3) times daily. Yes Provider, Historical   dextroamphetamine-amphetamine (ADDERALL) 20 mg tablet Take 20 mg by mouth two (2) times a day. Yes Provider, Historical   pantoprazole (PROTONIX) 40 mg tablet Take 1 Tab by mouth daily. 2/15/21  Yes Ange Pendletno NP   lamoTRIgine (LaMICtal) 200 mg tablet Take 1 Tab by mouth daily for 92 days. Take  by mouth daily.  3/25/21 4/26/21  Erasto Blanco, PRIYA   ARIPiprazole (ABILIFY) 10 mg tablet Take 1 Tab by mouth daily. Take 10 mg by mouth daily. 3/25/21 4/26/21  Kolby Mcgarry NP   folic acid (FOLVITE) 1 mg tablet Take 1 Tab by mouth daily. Take  by mouth daily. 3/25/21 4/26/21  Erasto Blanco NP   buPROPion SR (WELLBUTRIN, ZYBAN) 200 mg SR tablet Take 1 Tab by mouth two (2) times a day for 92 days. Take 200 mg by mouth two (2) times a day. 3/25/21 4/26/21  Erasto Blanco NP   Venlafaxine-ER 24 HR (EFFEXOR-ER) 150 mg tr24 tablet Take 1 Tab by mouth daily for 92 days. Take 150 mg by mouth daily. 3/25/21 4/26/21  Kolby Mcgarry NP   meloxicam (MOBIC) 15 mg tablet Take 15 mg by mouth daily. Provider, Historical   varicella-zoster recombinant, PF, (Shingrix, PF,) 50 mcg/0.5 mL susr injection 1 shot IM now, 2nd shot in 2-6 months. 2/15/21   Duc Zhou NP   propranoloL (INDERAL) 10 mg tablet Take 10 mg by mouth three (3) times daily. 4/26/21  Provider, Historical   famotidine (PEPCID) 20 mg tablet Take 20 mg by mouth two (2) times a day. 4/26/21  Provider, Historical   traZODone (DESYREL) 150 mg tablet Take 150 mg by mouth nightly. 4/26/21  Provider, Historical      Allergies   Allergen Reactions    Methotrexate Angioedema    Percocet [Oxycodone-Acetaminophen] Other (comments)     Gives her the \"creepy crawlies\"         Visit Vitals  /84 (BP 1 Location: Left upper arm, BP Patient Position: Sitting)   Pulse 86   Temp 98.4 °F (36.9 °C) (Temporal)   Resp 14   Ht 5' 8\" (1.727 m)   Wt 241 lb 9.6 oz (109.6 kg)   SpO2 97%   BMI 36.74 kg/m²     Physical Exam  Vitals signs and nursing note reviewed. Constitutional:       General: She is not in acute distress. Appearance: She is well-developed. HENT:      Head: Normocephalic and atraumatic.       Right Ear: External ear normal.      Left Ear: External ear normal.      Nose: Nose normal.   Eyes:      Conjunctiva/sclera: Conjunctivae normal. Cardiovascular:      Rate and Rhythm: Normal rate and regular rhythm. Heart sounds: Normal heart sounds. Pulmonary:      Effort: Pulmonary effort is normal.      Breath sounds: Normal breath sounds. No wheezing. Skin:     General: Skin is warm and dry. Neurological:      Mental Status: She is alert and oriented to person, place, and time. Psychiatric:         Speech: Speech normal.         Behavior: Behavior normal.               No results found for this or any previous visit (from the past 24 hour(s)). There are no active problems to display for this patient. ASSESSMENT AND PLAN:      ICD-10-CM ICD-9-CM   1. B12 deficiency  E53.8 266.2   2. Essential hypertension  I10 401.9   3. Anemia, unspecified type  D64.9 285.9   4. Medication refill  Z76.0 V68.1     Orders Placed This Encounter    lamoTRIgine (LaMICtal) 200 mg tablet     Sig: Take 1 Tab by mouth daily for 90 days. Take  by mouth daily. Dispense:  90 Tab     Refill:  0    ARIPiprazole (ABILIFY) 10 mg tablet     Sig: Take 1 Tab by mouth daily. Take 10 mg by mouth daily. Dispense:  90 Tab     Refill:  0    folic acid (FOLVITE) 1 mg tablet     Sig: Take 1 Tab by mouth daily. Take  by mouth daily. Dispense:  30 Tab     Refill:  5    buPROPion SR (WELLBUTRIN, ZYBAN) 200 mg SR tablet     Sig: Take 1 Tab by mouth two (2) times a day. Take 200 mg by mouth two (2) times a day. Dispense:  180 Tab     Refill:  0    propranoloL (INDERAL) 10 mg tablet     Sig: Take 1 Tab by mouth three (3) times daily. Dispense:  270 Tab     Refill:  0    famotidine (PEPCID) 20 mg tablet     Sig: Take 1 Tab by mouth two (2) times a day. Dispense:  180 Tab     Refill:  0    traZODone (DESYREL) 150 mg tablet     Sig: Take 1 Tab by mouth nightly. Dispense:  90 Tab     Refill:  0    Venlafaxine-ER 24 HR (EFFEXOR-ER) 150 mg tr24 tablet     Sig: Take 1 Tab by mouth daily for 90 days. Take 150 mg by mouth daily.      Dispense:  90 Tab Refill:  0    cyanocobalamin (VITAMIN B12) injection 1,000 mcg     Diagnoses and all orders for this visit:    1. B12 deficiency  -     cyanocobalamin (VITAMIN B12) injection 1,000 mcg    2. Essential hypertension  -     propranoloL (INDERAL) 10 mg tablet; Take 1 Tab by mouth three (3) times daily. 3. Anemia, unspecified type  -     folic acid (FOLVITE) 1 mg tablet; Take 1 Tab by mouth daily. Take  by mouth daily. -     cyanocobalamin (VITAMIN B12) injection 1,000 mcg    4. Medication refill  -     lamoTRIgine (LaMICtal) 200 mg tablet; Take 1 Tab by mouth daily for 90 days. Take  by mouth daily.  -     ARIPiprazole (ABILIFY) 10 mg tablet; Take 1 Tab by mouth daily. Take 10 mg by mouth daily. -     folic acid (FOLVITE) 1 mg tablet; Take 1 Tab by mouth daily. Take  by mouth daily. -     buPROPion SR (WELLBUTRIN, ZYBAN) 200 mg SR tablet; Take 1 Tab by mouth two (2) times a day. Take 200 mg by mouth two (2) times a day. -     propranoloL (INDERAL) 10 mg tablet; Take 1 Tab by mouth three (3) times daily. -     famotidine (PEPCID) 20 mg tablet; Take 1 Tab by mouth two (2) times a day. -     traZODone (DESYREL) 150 mg tablet; Take 1 Tab by mouth nightly. -     Venlafaxine-ER 24 HR (EFFEXOR-ER) 150 mg tr24 tablet; Take 1 Tab by mouth daily for 90 days. Take 150 mg by mouth daily. lab results and schedule of future lab studies reviewed with patient      Will schedule nurse visits for 2 additional weekly B12 injections- Recheck B12 level in 3 weeks 5/17/21. Follow-up and Dispositions    · Return if symptoms worsen or fail to improve. Disclaimer:  Advised her to call back or return to office if symptoms worsen/change/persist.  Discussed expected course/resolution/complications of diagnosis in detail with patient. Medication risks/benefits/alternatives discussed with patient. She was given an after visit summary which includes diagnoses, current medications, & vitals.      Discussed patient instructions and advised to read to all patient instructions regarding care. She expressed understanding with the diagnosis and plan.

## 2021-04-26 NOTE — PROGRESS NOTES
Chief Complaint   Patient presents with   Valeriano Morris [facility]     NP Zoraida Gaines Medication Refill    Injection B12     1. Have you been to the ER, urgent care clinic since your last visit? Hospitalized since your last visit? Yes [facility]: [facility], COVID, early march 2021    2. Have you seen or consulted any other health care providers outside of the [address] [place] [address] since your last visit? Include any pap smears or colon screening.   NO          Visit Vitals  /84 (BP 1 Location: Left upper arm, BP Patient Position: Sitting)   Pulse 86   Temp 98.4 °F (36.9 °C) (Temporal)   Resp 14   Ht 5' 8\" (1.727 m)   Wt 241 lb 9.6 oz (109.6 kg)   SpO2 97%   BMI 36.74 kg/m²

## 2021-04-30 DIAGNOSIS — F41.9 ANXIETY: Primary | ICD-10-CM

## 2021-04-30 RX ORDER — VENLAFAXINE HYDROCHLORIDE 150 MG/1
150 CAPSULE, EXTENDED RELEASE ORAL DAILY
Qty: 90 CAP | Refills: 1 | Status: SHIPPED | OUTPATIENT
Start: 2021-04-30

## 2021-04-30 NOTE — TELEPHONE ENCOUNTER
Called back pharmacist for clarification, patient's insurance covers the Effexor ER capsules, not tablets. Pharmacist wants new Rx. Sent to provider.

## 2021-05-03 ENCOUNTER — CLINICAL SUPPORT (OUTPATIENT)
Dept: PRIMARY CARE CLINIC | Age: 51
End: 2021-05-03
Payer: COMMERCIAL

## 2021-05-03 VITALS
TEMPERATURE: 98.5 F | WEIGHT: 240.4 LBS | OXYGEN SATURATION: 99 % | SYSTOLIC BLOOD PRESSURE: 131 MMHG | HEIGHT: 68 IN | HEART RATE: 75 BPM | DIASTOLIC BLOOD PRESSURE: 84 MMHG | BODY MASS INDEX: 36.43 KG/M2 | RESPIRATION RATE: 16 BRPM

## 2021-05-03 DIAGNOSIS — E53.8 B12 DEFICIENCY: Primary | ICD-10-CM

## 2021-05-03 PROCEDURE — 96372 THER/PROPH/DIAG INJ SC/IM: CPT | Performed by: NURSE PRACTITIONER

## 2021-05-03 RX ORDER — CYANOCOBALAMIN 1000 UG/ML
1000 INJECTION, SOLUTION INTRAMUSCULAR; SUBCUTANEOUS ONCE
Status: COMPLETED | OUTPATIENT
Start: 2021-05-03 | End: 2021-05-03

## 2021-05-03 RX ADMIN — CYANOCOBALAMIN 1000 MCG: 1000 INJECTION, SOLUTION INTRAMUSCULAR; SUBCUTANEOUS at 10:45

## 2021-05-14 ENCOUNTER — OFFICE VISIT (OUTPATIENT)
Dept: RHEUMATOLOGY | Age: 51
End: 2021-05-14
Payer: COMMERCIAL

## 2021-05-14 VITALS
OXYGEN SATURATION: 97 % | SYSTOLIC BLOOD PRESSURE: 138 MMHG | DIASTOLIC BLOOD PRESSURE: 86 MMHG | HEART RATE: 81 BPM | RESPIRATION RATE: 20 BRPM | HEIGHT: 68 IN | TEMPERATURE: 98.3 F | WEIGHT: 241.8 LBS | BODY MASS INDEX: 36.65 KG/M2

## 2021-05-14 DIAGNOSIS — G56.03 CARPAL TUNNEL SYNDROME ON BOTH SIDES: Primary | ICD-10-CM

## 2021-05-14 DIAGNOSIS — M13.80 SERONEGATIVE ARTHRITIS: ICD-10-CM

## 2021-05-14 PROCEDURE — 99215 OFFICE O/P EST HI 40 MIN: CPT | Performed by: INTERNAL MEDICINE

## 2021-05-14 RX ORDER — CYANOCOBALAMIN 1000 UG/ML
1000 INJECTION, SOLUTION INTRAMUSCULAR; SUBCUTANEOUS
COMMUNITY

## 2021-05-14 RX ORDER — GABAPENTIN 600 MG/1
600 TABLET ORAL 3 TIMES DAILY
Qty: 90 TAB | Refills: 3 | Status: SHIPPED | OUTPATIENT
Start: 2021-05-14

## 2021-05-14 RX ORDER — ARM BRACE
1 EACH MISCELLANEOUS
Qty: 2 EACH | Refills: 0 | Status: SHIPPED | OUTPATIENT
Start: 2021-05-14 | End: 2021-07-01

## 2021-05-14 NOTE — PROGRESS NOTES
REASON FOR VISIT:   Harris Sharp is a 48 y.o. female with history of depression, PTSD, non-crystal proven gout, and hypertension who is returning early for followup of HLA-B27+ seronegative rheumatoid arthritis. HISTORY OF PRESENT ILLNESS    Continues leflunomide 20mg daily. Having more bilateral hand dysesthesias, especially radial 3 fingers, worst at night. Hasn't tried wrist splints. Notes she ran out of gabapentin a month ago, says PCP does not prescribe gabapentin as it is a controlled substance. Had been taking 600mg TID before she stopped abruptly. Some more forearm arthralgias. Has had some mid-wrist volar contracture since her 20's. 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 REVIEW OF SYSTEMS  A comprehensive review of systems was negative except for that written in the HPI. A 10-point review of systems is per the new patient questionnaire, which has been reviewed extensively and scanned into the electronic medical record for future reference. Review of systems is as above and is otherwise negative. ALLERGIES  Methotrexate and Percocet [oxycodone-acetaminophen]    MEDICATIONS  Current Outpatient Medications   Medication Sig    venlafaxine-SR (Effexor XR) 150 mg capsule Take 1 Cap by mouth daily.  lamoTRIgine (LaMICtal) 200 mg tablet Take 1 Tab by mouth daily for 90 days. Take  by mouth daily.  ARIPiprazole (ABILIFY) 10 mg tablet Take 1 Tab by mouth daily. Take 10 mg by mouth daily.  folic acid (FOLVITE) 1 mg tablet Take 1 Tab by mouth daily. Take  by mouth daily.  buPROPion SR (WELLBUTRIN, ZYBAN) 200 mg SR tablet Take 1 Tab by mouth two (2) times a day. Take 200 mg by mouth two (2) times a day.  propranoloL (INDERAL) 10 mg tablet Take 1 Tab by mouth three (3) times daily.  famotidine (PEPCID) 20 mg tablet Take 1 Tab by mouth two (2) times a day.  traZODone (DESYREL) 150 mg tablet Take 1 Tab by mouth nightly.     hydroCHLOROthiazide (HYDRODIURIL) 50 mg tablet Take 1 Tab by mouth daily.  leflunomide (ARAVA) 20 mg tablet Take 1 Tab by mouth daily. Take half tab daily for 7 days and then one tab daily if tolerated    meloxicam (MOBIC) 15 mg tablet Take 15 mg by mouth daily.  vit W-U1-T7-folic-B12-ALA-Q10 15 LVYN-83 mg- 12.5 mg-1 mg cap Take 1 mg by mouth.  gabapentin (NEURONTIN) 600 mg tablet Take 600 mg by mouth three (3) times daily.  dextroamphetamine-amphetamine (ADDERALL) 20 mg tablet Take 20 mg by mouth two (2) times a day.  pantoprazole (PROTONIX) 40 mg tablet Take 1 Tab by mouth daily.  varicella-zoster recombinant, PF, (Shingrix, PF,) 50 mcg/0.5 mL susr injection 1 shot IM now, 2nd shot in 2-6 months. No current facility-administered medications for this visit. PAST MEDICAL HISTORY  Past Medical History:   Diagnosis Date    Anxiety     Bipolar affect, depressed ([facility] [address]     COVID-19 03/2021    History of rheumatoid arthritis     PTSD (post-traumatic stress disorder)        FAMILY HISTORY  family history includes Arthritis in her mother; Arthritis-rheumatoid in her mother, paternal aunt, and sister; Cancer in her father; Dementia in her mother; Diabetes in her father; Gout in her maternal grandmother; Heart Disease in her father; Lupus in her paternal uncle; Osteoporosis in her mother; Other in her father; Psychiatric Disorder in her mother. SOCIAL HISTORY  She  reports that she quit smoking about 16 months ago. She has never used smokeless tobacco. She reports current alcohol use. She reports current drug use. Drug: Marijuana. 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 Born and raised in [place]. Son lives in [place]. Drinks a glass of wine once or twice a month. Quit smoking after 1/2 PPD x35yr.        DATA  Visit Vitals  Ht 5' 8\" (1.727 m)   Wt 241 lb 12.8 oz (109.7 kg)   BMI 36.77 kg/m² Body mass index is 36.77 kg/m². No flowsheet data found. Joint Count 2/23/2021   MHAQ 0.75   Left wrist- Tender 1   Left wrist- Swollen 0   Left 1st MCP - Tender 1   Left 1st MCP - Swollen 0   Left knee - Tender 1   Left knee - Swollen 0   Right 2nd PIP - Tender 1   Right 2nd PIP - Swollen 0   Tender Joint Count (Total) 4   Swollen Joint Count (Total) 0   Physician Assessment (0-10) 2   Patient Assessment (0-10) 6   CDAI Total (calculated) 12       General:  The patient is pleasant, obese, well nourished, alert, down-appearing, but in no apparent distress. Eyes: Sclera are anicteric. No conjunctival injection. HEENT:  Oropharynx is clear. No oral ulcers. Adequate salivary pooling. No cervical or supraclavicular lymphadenopathy. Lungs:  Clear to auscultation bilaterally, without wheeze or stridor. Normal respiratory effort. Cor:  Regular rate and rhythm. No murmurs rubs or gallops. Abdomen: Sities: No calf tenderness or edema. Warm and well perfused. Skin:  No significant abnormalitiesoft, non-tender, without hepatomegaly or masses. Extrem. No petechial, purpuric, or psoriaform rashes. Normal nails. No sclerodactyly. Neuro: +Tinel at wrists. No foot or wrist drop. Negative SLR bilaterally. Musculoskeletal:    A comprehensive musculoskeletal exam was performed for all joints of each upper and lower extremity and assessed for swelling, tenderness and range of motion. Results are documented as below:  Cutaneous tightening over volar distal forearm, no tendon friction rubs, intact ROM in wrist, no triggering of fingers. Negative MTP squeeze tenderness  No evidence of synovitis in the shoulders, elbows, hips, knees or ankles.    mShober 15->20cm previously, not repeated today    Labs:  3/4/21: CMP WNL, ESR 18, CRP 10mg/L  12/17/20: ESR 19, CMP WNL (Cr 098), CBC WNL, uric acid 7.0  7/8/20: Uric acid 7.5, CCP negative, RF negative, B12 183, SHERON negative, HLA-B27+    Imaging:  3/4/21 Lspine xray: Prominent chronic disc degeneration L5-S1 with some posterior element hypertrophy. 3/4/21 XR hands, feet, knees, pelvis: No bony erosions, periarticular osteopenia, or chondrocalcinosis. Ely Pastrana  Ms. Ray Serrano is a 48 y.o. female who presents for evaluation of HLA-B27+ seronegative rheumatoid arthritis with low disease activity by CDAI on leflunomide, but worsened symptomatic carpal tunnel with abrupt cessation of gabapentin. She has some volar tightening of the superficial skin which she feels was acquired in her 19's, and could be predisposing her to carpal tunnel as well. Referring to Dr. Montez Javier with ortho hand for ?role of injections or surgical intervention. Reviewed importance of wrist bracing, and in the future of notifying us with problems getting gabapentin given the risks of withdrawal associated with this medication. 1. Seronegative arthritis  -Cont leflunomide 20mg daily. - Arm Brace (Wrist Brace Large) misc; 1 Applicator by Does Not Apply route nightly. Bilateral carpal tunnel wrist braces, wear at night. Size large. Dx: G56.03, M13.80. Face to face 5/14/21. Dispense: 2 Each; Refill: 0    2. Carpal tunnel syndrome on both sides  - Arm Brace (Wrist Brace Large) misc; 1 Applicator by Does Not Apply route nightly. Bilateral carpal tunnel wrist braces, wear at night. Size large. Dx: G56.03, M13.80. Face to face 5/14/21. Dispense: 2 Each; Refill: 0  - gabapentin (NEURONTIN) 600 mg tablet; Take 1 Tab by mouth three (3) times daily. Max Daily Amount: 1,800 mg. Dispense: 90 Tab; Refill: 3  - REFERRAL TO ORTHOPEDICS      Patient Instructions   1. Try wearing carpal tunnel wrist splints at night to relieve the irritation of the wrists. 2. Continue diclofenac twice a day consistently for now. 3. Reach out to Dr. Montez Javier to schedule carpal tunnel evaluation at [place]. 4. Continue leflunomide for now. 5. Restart gabapentin 600mg 3x/day.     6. Look for Voltaren gel (diclofenac gel) from your local [facility] or [facility]    7. Return in July as previously scheduled. Orders Placed This Encounter    Slava (hand) ORTHO ref [facility]    cyanocobalamin (Vitamin B-12) 1,000 mcg/mL injection    [place] Brace [place] Brace Large[place] [facility]    gabapentin (NEURONTIN) 600 mg tablet       Medications: I have changed Laura Flanagan's gabapentin. I am also having her start on Wrist Brace Large. Additionally, I am having her maintain her meloxicam, vit T-M7-H6-folic-B12-ALA-Q10, dextroamphetamine-amphetamine, pantoprazole, Shingrix (PF), leflunomide, hydroCHLOROthiazide, lamoTRIgine, ARIPiprazole, folic acid, buPROPion SR, propranoloL, famotidine, traZODone, venlafaxine-SR, and cyanocobalamin. Follow up: Return in about 2 months (around 7/14/2021).      Face to face time: 30 minutes  Note preparation and records review day of service: 15 minutes  Total provider time day of service: 45 minutes      Mickey Adams MD    Adult Rheumatology   [address]  [place], [address]   Phone [phone]  Fax [phone]

## 2021-05-14 NOTE — PATIENT INSTRUCTIONS
1. Try wearing carpal tunnel wrist splints at night to relieve the irritation of the wrists. 2. Continue diclofenac twice a day consistently for now. 3. Reach out to Dr. Ariela Walsh to schedule carpal tunnel evaluation at [place]. 4. Continue leflunomide for now. 5. Restart gabapentin 600mg 3x/day. 6. Look for Voltaren gel (diclofenac gel) from your local [facility] or [facility]    7. Return in July as previously scheduled.

## 2021-05-14 NOTE — LETTER
5/14/2021    Patient: Adriel Villanueva   YOB: 1970   Date of Visit: 5/14/2021     Woody Lynch NP  [address] [address]  Via In [address] [address]  Via Fax: [phone]    Dear PRIYA Lauren MD,      Thank you for referring Ms. Adriel Villanueva to [address] [place] [address] for evaluation. My notes for this consultation are attached. If you have questions, please do not hesitate to call me. I look forward to following your patient along with you.       Sincerely,    Cara Sanchez MD

## 2021-05-21 ENCOUNTER — CLINICAL SUPPORT (OUTPATIENT)
Dept: PRIMARY CARE CLINIC | Age: 51
End: 2021-05-21
Payer: COMMERCIAL

## 2021-05-21 VITALS
HEART RATE: 81 BPM | SYSTOLIC BLOOD PRESSURE: 113 MMHG | WEIGHT: 241.8 LBS | HEIGHT: 68 IN | RESPIRATION RATE: 16 BRPM | TEMPERATURE: 98.2 F | BODY MASS INDEX: 36.65 KG/M2 | OXYGEN SATURATION: 100 % | DIASTOLIC BLOOD PRESSURE: 78 MMHG

## 2021-05-21 DIAGNOSIS — E53.8 B12 DEFICIENCY: Primary | ICD-10-CM

## 2021-05-21 PROCEDURE — 96372 THER/PROPH/DIAG INJ SC/IM: CPT | Performed by: NURSE PRACTITIONER

## 2021-05-21 RX ORDER — CYANOCOBALAMIN 1000 UG/ML
1000 INJECTION, SOLUTION INTRAMUSCULAR; SUBCUTANEOUS ONCE
Status: COMPLETED | OUTPATIENT
Start: 2021-05-21 | End: 2021-05-21

## 2021-05-21 RX ADMIN — CYANOCOBALAMIN 1000 MCG: 1000 INJECTION, SOLUTION INTRAMUSCULAR; SUBCUTANEOUS at 09:16

## 2021-05-21 NOTE — PROGRESS NOTES
Chief Complaint   Patient presents with    Injection B12     patient says she is still feeling tired. 1. Have you been to the ER, urgent care clinic since your last visit? Hospitalized since your last visit? No    2. Have you seen or consulted any other health care providers outside of the [address] [place] [address] since your last visit? Include any pap smears or colon screening.  No  Visit Vitals  /78 (BP 1 Location: Right upper arm, BP Patient Position: Sitting, BP Cuff Size: Adult)   Pulse 81   Temp 98.2 °F (36.8 °C) (Temporal)   Resp 16   Ht 5' 8\" (1.727 m)   Wt 241 lb 12.8 oz (109.7 kg)   SpO2 100%   BMI 36.77 kg/m²

## 2021-06-28 ENCOUNTER — OFFICE VISIT (OUTPATIENT)
Dept: PRIMARY CARE CLINIC | Age: 51
End: 2021-06-28
Payer: COMMERCIAL

## 2021-06-28 VITALS
WEIGHT: 247.4 LBS | SYSTOLIC BLOOD PRESSURE: 119 MMHG | DIASTOLIC BLOOD PRESSURE: 75 MMHG | HEART RATE: 75 BPM | OXYGEN SATURATION: 97 % | TEMPERATURE: 98.3 F | RESPIRATION RATE: 16 BRPM | HEIGHT: 68 IN | BODY MASS INDEX: 37.5 KG/M2

## 2021-06-28 DIAGNOSIS — M79.641 PAIN IN BOTH HANDS: ICD-10-CM

## 2021-06-28 DIAGNOSIS — G56.03 BILATERAL CARPAL TUNNEL SYNDROME: Primary | ICD-10-CM

## 2021-06-28 DIAGNOSIS — I10 ESSENTIAL HYPERTENSION: ICD-10-CM

## 2021-06-28 DIAGNOSIS — E66.01 SEVERE OBESITY (HCC): ICD-10-CM

## 2021-06-28 DIAGNOSIS — Z01.818 PRE-OPERATIVE GENERAL PHYSICAL EXAMINATION: ICD-10-CM

## 2021-06-28 DIAGNOSIS — M79.642 PAIN IN BOTH HANDS: ICD-10-CM

## 2021-06-28 DIAGNOSIS — M05.79 RHEUMATOID ARTHRITIS INVOLVING MULTIPLE SITES WITH POSITIVE RHEUMATOID FACTOR (HCC): ICD-10-CM

## 2021-06-28 PROCEDURE — 99214 OFFICE O/P EST MOD 30 MIN: CPT | Performed by: NURSE PRACTITIONER

## 2021-06-28 NOTE — PROGRESS NOTES
Preoperative Evaluation    Date of Exam: 2021    Rosalie Phalen is a 48 y.o. female (:1970) who presents for preoperative evaluation. Procedure/Surgery: Right carpal tunnel release. = Ax block  Date of Procedure/Surgery:   Surgeon: Dr. Rg Jay: [facility]  Primary Physician: Chasity Plascencia NP  Latex Allergy: no    Problem List:   There are no problems to display for this patient. Medical History:     Past Medical History:   Diagnosis Date    Anxiety     Bipolar affect, depressed ([address]     COVID-19 2021    History of rheumatoid arthritis     PTSD (post-traumatic stress disorder)      Allergies: Allergies   Allergen Reactions    Methotrexate Angioedema    Percocet [Oxycodone-Acetaminophen] Other (comments)     Gives her the \"creepy crawlies\"       Medications:     Current Outpatient Medications   Medication Sig    pyridoxine, vitamin B6, (Vitamin B-6) 25 mg tablet Take 25 mg by mouth two (2) times a day.  cyanocobalamin (Vitamin B-12) 1,000 mcg/mL injection 1,000 mcg by IntraMUSCular route once.  Arm Brace (Wrist Brace Large) misc 1 Applicator by Does Not Apply route nightly. Bilateral carpal tunnel wrist braces, wear at night. Size large. Dx: G56.03, M13.80. Face to face 21.  gabapentin (NEURONTIN) 600 mg tablet Take 1 Tab by mouth three (3) times daily. Max Daily Amount: 1,800 mg.    venlafaxine-SR (Effexor XR) 150 mg capsule Take 1 Cap by mouth daily.  lamoTRIgine (LaMICtal) 200 mg tablet Take 1 Tab by mouth daily for 90 days. Take  by mouth daily.  ARIPiprazole (ABILIFY) 10 mg tablet Take 1 Tab by mouth daily. Take 10 mg by mouth daily.  folic acid (FOLVITE) 1 mg tablet Take 1 Tab by mouth daily. Take  by mouth daily.  buPROPion SR (WELLBUTRIN, ZYBAN) 200 mg SR tablet Take 1 Tab by mouth two (2) times a day. Take 200 mg by mouth two (2) times a day.     propranoloL (INDERAL) 10 mg tablet Take 1 Tab by mouth three (3) times daily.  famotidine (PEPCID) 20 mg tablet Take 1 Tab by mouth two (2) times a day.  traZODone (DESYREL) 150 mg tablet Take 1 Tab by mouth nightly.  hydroCHLOROthiazide (HYDRODIURIL) 50 mg tablet Take 1 Tab by mouth daily.  leflunomide (ARAVA) 20 mg tablet Take 1 Tab by mouth daily. Take half tab daily for 7 days and then one tab daily if tolerated    vit N-G2-F6-folic-B12-ALA-Q10 15 RKOV-76 mg- 12.5 mg-1 mg cap Take 1 mg by mouth.  dextroamphetamine-amphetamine (ADDERALL) 20 mg tablet Take 20 mg by mouth two (2) times a day.  varicella-zoster recombinant, PF, (Shingrix, PF,) 50 mcg/0.5 mL susr injection 1 shot IM now, 2nd shot in 2-6 months. No current facility-administered medications for this visit. Surgical History:     Past Surgical History:   Procedure Laterality Date    HX CHOLECYSTECTOMY      gallbladder taken out    HX GYN      complete hysterectomy     Social History:     Social History     Socioeconomic History    Marital status: SINGLE     Spouse name: Not on file    Number of children: Not on file    Years of education: Not on file    Highest education level: Not on file   Tobacco Use    Smoking status: Former Smoker     Quit date: 2020     Years since quittin.4    Smokeless tobacco: Never Used   Vaping Use    Vaping Use: Every day    Devices: Disposable   Substance and Sexual Activity    Alcohol use: Yes     Comment: occasionally    Drug use: Yes     Types: Marijuana     Comment: medical marijuana license in [facility]/ helps with her hurting so back and panic attacks, needs [facility]    Other Topics Concern     Social Determinants of Health     Financial Resource Strain:     Difficulty of Paying Living Expenses:    Food Insecurity:     Worried About Running Out of Food in the Last Year:     Ran Out of Food in the Last Year:    Transportation Needs:     Lack of Transportation (Medical):      Lack of Transportation (Non-Medical):    Physical Activity:     Days of Exercise per Week:     Minutes of Exercise per Session:    Stress:     Feeling of Stress :    Social Connections:     Frequency of Communication with Friends and Family:     Frequency of Social Gatherings with Friends and Family:     Attends [employer] Services:     Active Member of Clubs or Organizations:     Attends Club or Organization Meetings:     Marital Status:        Recent use of: No recent use of aspirin (ASA), NSAIDS or steroids    Tetanus up to date: tetanus status unknown to the patient probably > 10 years ago      Anesthesia Complications: None  History of abnormal bleeding : None  History of Blood Transfusions: no  Health Care Directive or Living Will: no    REVIEW OF SYSTEMS:  Review of Systems   Musculoskeletal: Positive for joint pain. Neurological: Positive for sensory change (finger tips. ). All other systems reviewed and are negative. EXAM:    height is 5' 8\" (1.727 m) and weight is 247 lb 6.4 oz (112.2 kg). Her oral temperature is 98.3 °F (36.8 °C). Her blood pressure is 119/75 and her pulse is 75. Her respiration is 16 and oxygen saturation is 97%. Physical Exam  Vitals and nursing note reviewed. Constitutional:       General: She is not in acute distress. Appearance: She is well-developed. She is obese. HENT:      Head: Normocephalic and atraumatic. Right Ear: External ear normal.      Left Ear: External ear normal.      Mouth/Throat:      Pharynx: Oropharynx is clear. Eyes:      Conjunctiva/sclera: Conjunctivae normal.   Cardiovascular:      Rate and Rhythm: Normal rate and regular rhythm. Heart sounds: Normal heart sounds. Pulmonary:      Effort: Pulmonary effort is normal.      Breath sounds: Normal breath sounds. No wheezing. Abdominal:      General: Bowel sounds are normal. There is no distension. Tenderness: There is no abdominal tenderness.    Musculoskeletal:      Right wrist: Tenderness present. Left wrist: Tenderness present. Right hand: Normal capillary refill. Left hand: Normal capillary refill. Cervical back: Neck supple. Right lower leg: No edema. Left lower leg: No edema. Skin:     General: Skin is warm and dry. Neurological:      Mental Status: She is alert and oriented to person, place, and time. Comments: Finger tips numb BL- r/t carpal tunnel. Psychiatric:         Mood and Affect: Mood normal.         Behavior: Behavior normal.         Thought Content: Thought content normal.         DIAGNOSTICS:   1. EKG: EKG FINDINGS -   EKG Results     None        2. CXR: Not on file. 3. Labs:   No visits with results within 3 Month(s) from this visit. Latest known visit with results is:   Orders Only on 03/04/2021   Component Date Value Ref Range Status    WBC 03/04/2021 6.3  3.4 - 10.8 x10E3/uL Final    RBC 03/04/2021 4.34  3.77 - 5.28 x10E6/uL Final    HGB 03/04/2021 12.6  11.1 - 15.9 g/dL Final    HCT 03/04/2021 39.0  34.0 - 46.6 % Final    MCV 03/04/2021 90  79 - 97 fL Final    MCH 03/04/2021 29.0  26.6 - 33.0 pg Final    MCHC 03/04/2021 32.3  31.5 - 35.7 g/dL Final    RDW 03/04/2021 12.4  11.7 - 15.4 % Final    PLATELET 86/26/6978 549  150 - 450 x10E3/uL Final    NEUTROPHILS 03/04/2021 67  Not Estab. % Final    Lymphocytes 03/04/2021 23  Not Estab. % Final    MONOCYTES 03/04/2021 6  Not Estab. % Final    EOSINOPHILS 03/04/2021 3  Not Estab. % Final    BASOPHILS 03/04/2021 1  Not Estab. % Final    ABS. NEUTROPHILS 03/04/2021 4.2  1.4 - 7.0 x10E3/uL Final    Abs Lymphocytes 03/04/2021 1.5  0.7 - 3.1 x10E3/uL Final    ABS. MONOCYTES 03/04/2021 0.4  0.1 - 0.9 x10E3/uL Final    ABS. EOSINOPHILS 03/04/2021 0.2  0.0 - 0.4 x10E3/uL Final    ABS. BASOPHILS 03/04/2021 0.1  0.0 - 0.2 x10E3/uL Final    IMMATURE GRANULOCYTES 03/04/2021 0  Not Estab. % Final    ABS. IMM.  GRANS. 03/04/2021 0.0  0.0 - 0.1 x10E3/uL Final    Glucose 03/04/2021 144* 65 - 99 mg/dL Final    BUN 03/04/2021 17  6 - 24 mg/dL Final    Creatinine 03/04/2021 0.60  0.57 - 1.00 mg/dL Final    GFR est non-AA 03/04/2021 107  >59 mL/min/1.73 Final    GFR est AA 03/04/2021 123  >59 mL/min/1.73 Final    BUN/Creatinine ratio 03/04/2021 28* 9 - 23 Final    Sodium 03/04/2021 139  134 - 144 mmol/L Final    Potassium 03/04/2021 4.1  3.5 - 5.2 mmol/L Final    Chloride 03/04/2021 103  96 - 106 mmol/L Final    CO2 03/04/2021 25  20 - 29 mmol/L Final    Calcium 03/04/2021 8.9  8.7 - 10.2 mg/dL Final    Protein, total 03/04/2021 6.1  6.0 - 8.5 g/dL Final    Albumin 03/04/2021 3.7* 3.8 - 4.8 g/dL Final    GLOBULIN, TOTAL 03/04/2021 2.4  1.5 - 4.5 g/dL Final    A-G Ratio 03/04/2021 1.5  1.2 - 2.2 Final    Bilirubin, total 03/04/2021 <0.2  0.0 - 1.2 mg/dL Final    Alk. phosphatase 03/04/2021 96  39 - 117 IU/L Final    AST (SGOT) 03/04/2021 10  0 - 40 IU/L Final    ALT (SGPT) 03/04/2021 11  0 - 32 IU/L Final    Hep B surface Ag screen 03/04/2021 Negative  Negative Final    Hepatitis Be Antigen 03/04/2021 Negative  Negative Final    Hep B Core Ab, IgM 03/04/2021 Negative  Negative Final    Hep B Core Ab, total 03/04/2021 Negative  Negative Final    Hepatitis Be Antibody 03/04/2021 Negative  Negative Final    HEP B SURFACE AB, QUAL 03/04/2021 Reactive   Final    Comment:               Non Reactive: Inconsistent with immunity,                              less than 10 mIU/mL                Reactive:     Consistent with immunity,                              greater than 9.9 mIU/mL      HCV Ab 03/04/2021 <0.1  0.0 - 0.9 s/co ratio Final    Comment 03/04/2021 Comment   Final    Comment: Non reactive HCV antibody screen is consistent with no HCV infection,  unless recent infection is suspected or other evidence exists to  indicate HCV infection.  QuantiFERON Incubation 03/04/2021 Incubation performed. Final    QuantiFERON Criteria 03/04/2021 Comment   Final    Comment:  The QuantiFERON-TB Gold Plus result is determined by subtracting  the Nil value from either TB antigen (Ag) tube. The mitogen tube  serves as a control for the test.      QuantiFERON TB1 Ag 03/04/2021 0.02  IU/mL Final    QuantiFERON TB2 Ag 03/04/2021 0.02  IU/mL Final    QuantiFERON Nil Value 03/04/2021 0.03  IU/mL Final    QuantiFERON Mitogen Value 03/04/2021 >10.00  IU/mL Final    QuantiFERON Plus 03/04/2021 Negative  Negative Final    Sed rate (ESR) 03/04/2021 18  0 - 40 mm/hr Final    C-Reactive Protein, Qt 03/04/2021 10  0 - 10 mg/L Final       IMPRESSION:   1. BL carpal tunnel  2.  Wrist and hand pain BL  Acceptable risk for planned procedurel    Vinay Antoine NP   6/28/2021

## 2021-06-28 NOTE — PROGRESS NOTES
Chief Complaint   Patient presents with    Pre-op Exam     1. Have you been to the ER, urgent care clinic since your last visit? Hospitalized since your last visit? No    2. Have you seen or consulted any other health care providers outside of the [address] [place] [address] since your last visit? Include any pap smears or colon screening.  No    Visit Vitals  /75 (BP 1 Location: Right upper arm, BP Patient Position: Sitting, BP Cuff Size: Adult)   Pulse 75   Temp 98.3 °F (36.8 °C) (Oral)   Resp 16   Ht 5' 8\" (1.727 m)   Wt 247 lb 6.4 oz (112.2 kg)   SpO2 97%   BMI 37.62 kg/m²

## 2021-07-01 NOTE — PERIOP NOTES
[address] [facility] [address], [address] [facility]   MAIN OR                                  [phone]   MAIN PRE OP                          [phone]                                                                                AMBULATORY PRE OP          [phone]  PRE-ADMISSION TESTING    [phone]   Surgery Date:  7/7/2021      Is surgery arrival time given by surgeon? NO  If NO, [address] staff will call you between 3 and 7pm the day before your surgery with your arrival time. (If your surgery is on a Monday, we will call you the Friday before.)    Call [phone] after 7pm Monday-Friday if you did not receive this call. INSTRUCTIONS BEFORE YOUR SURGERY   When You  Arrive Arrive at the [address] [facility] on the day of your surgery  Have your insurance card, photo ID, and any copayment (if needed)   Food   and   Drink NO food or drink after midnight the night before surgery    This means NO water, gum, mints, coffee, juice, etc.  No alcohol (beer, wine, liquor) 24 hours before and after surgery   Medications to   TAKE   Morning of Surgery MEDICATIONS TO TAKE THE MORNING OF SURGERY WITH A SIP OF WATER:    Wellbutrin   Pepcid   Gabapentin   Propanolol   Venlafaxine    Call your rheumatologist to see if you need to stop your Arava prior to surgery. Medications  To  STOP      7 days before surgery  Non-Steroidal anti-inflammatory Drugs (NSAID's): for example, Ibuprofen (Advil, Motrin), Naproxen (Aleve)   Aspirin, if taking for pain    Herbal supplements, vitamins, and fish oil   Other:  (Pain medications not listed above, including Tylenol may be taken)   Blood  Thinners  If you take  Aspirin, Plavix, Coumadin, or any blood-thinning or anti-blood clot medicine, talk to the doctor who prescribed the medications for pre-operative instructions.    Bathing Clothing  Jewelry  Valuables      If you shower the morning of surgery, please do not apply anything to your skin (lotions, powders, deodorant, or makeup, especially mascara)   Follow Chlorhexidine [employer] body wash instructions provided to you during PAT appointment. Begin 3 days prior to surgery.  Do not shave or trim anywhere 24 hours before surgery   Wear your hair loose or down; no pony-tails, buns, or metal hair clips   Wear loose, comfortable, clean clothes   Wear glasses instead of contacts  [employer] money, valuables, and jewelry, including body piercings, at home   If you were given an [employer], bring it on day of surgery. Going Home - or Spending the Night  SAME-DAY SURGERY: You must have a responsible adult drive you home and stay with you 24 hours after surgery   ADMITS: If your doctor is keeping you in the hospital after surgery, leave personal belongings/luggage in your car until you have a hospital room number. Hospital discharge time is 12 noon  Drivers must be here before 12 noon unless you are told differently   Special Instructions        Follow all instructions so your surgery wont be cancelled. Please, be on time. If a situation occurs and you are delayed the day of surgery, call  [phone]. If your physical condition changes (like a fever, cold, flu, etc.) call your surgeon. Home medication(s) reviewed and verified verbally during PAT appointment. The patient was contacted by phone. The patient verbalizes understanding of all instructions and does not need reinforcement.

## 2021-07-06 ENCOUNTER — ANESTHESIA EVENT (OUTPATIENT)
Dept: SURGERY | Age: 51
End: 2021-07-06
Payer: COMMERCIAL

## 2021-07-07 ENCOUNTER — HOSPITAL ENCOUNTER (OUTPATIENT)
Age: 51
Setting detail: OUTPATIENT SURGERY
Discharge: HOME | End: 2021-07-07
Attending: ORTHOPAEDIC SURGERY | Admitting: ORTHOPAEDIC SURGERY
Payer: COMMERCIAL

## 2021-07-07 ENCOUNTER — ANESTHESIA (OUTPATIENT)
Dept: SURGERY | Age: 51
End: 2021-07-07
Payer: COMMERCIAL

## 2021-07-07 VITALS
WEIGHT: 247.58 LBS | TEMPERATURE: 99 F | DIASTOLIC BLOOD PRESSURE: 75 MMHG | SYSTOLIC BLOOD PRESSURE: 119 MMHG | RESPIRATION RATE: 16 BRPM | BODY MASS INDEX: 37.52 KG/M2 | HEIGHT: 68 IN | OXYGEN SATURATION: 97 % | HEART RATE: 80 BPM

## 2021-07-07 DIAGNOSIS — G56.01 CARPAL TUNNEL SYNDROME ON RIGHT: Primary | ICD-10-CM

## 2021-07-07 PROCEDURE — 76030000000 HC AMB SURG OR TIME 0.5 TO 1: Performed by: ORTHOPAEDIC SURGERY

## 2021-07-07 PROCEDURE — 74011250636 HC RX REV CODE- 250/636: Performed by: ANESTHESIOLOGY

## 2021-07-07 PROCEDURE — 76060000061 HC AMB SURG ANES 0.5 TO 1 HR: Performed by: ORTHOPAEDIC SURGERY

## 2021-07-07 PROCEDURE — 77030018836 HC SOL IRR NACL ICUM -A: Performed by: ORTHOPAEDIC SURGERY

## 2021-07-07 PROCEDURE — 77030006689 HC BLD OPHTH BVR BD -A: Performed by: ORTHOPAEDIC SURGERY

## 2021-07-07 PROCEDURE — 77030010777 HC CRDL FT DERY -B

## 2021-07-07 PROCEDURE — 77030040922 HC BLNKT HYPOTHRM STRY -A

## 2021-07-07 PROCEDURE — 76210000034 HC AMBSU PH I REC 0.5 TO 1 HR: Performed by: ORTHOPAEDIC SURGERY

## 2021-07-07 PROCEDURE — A4565 SLINGS: HCPCS

## 2021-07-07 PROCEDURE — 74011250636 HC RX REV CODE- 250/636: Performed by: ORTHOPAEDIC SURGERY

## 2021-07-07 PROCEDURE — 77030006602 HC BLD CRPL AGEE MCRA -C: Performed by: ORTHOPAEDIC SURGERY

## 2021-07-07 PROCEDURE — 74011000250 HC RX REV CODE- 250: Performed by: ANESTHESIOLOGY

## 2021-07-07 PROCEDURE — 77030040361 HC SLV COMPR DVT MDII -B

## 2021-07-07 PROCEDURE — 77030000032 HC CUF TRNQT ZIMM -B: Performed by: ORTHOPAEDIC SURGERY

## 2021-07-07 PROCEDURE — 77030003601 HC NDL NRV BLK BBMI -A

## 2021-07-07 PROCEDURE — 76210000046 HC AMBSU PH II REC FIRST 0.5 HR: Performed by: ORTHOPAEDIC SURGERY

## 2021-07-07 PROCEDURE — 77030002986 HC SUT PROL J&J -A: Performed by: ORTHOPAEDIC SURGERY

## 2021-07-07 PROCEDURE — 2709999900 HC NON-CHARGEABLE SUPPLY: Performed by: ORTHOPAEDIC SURGERY

## 2021-07-07 PROCEDURE — 74011000250 HC RX REV CODE- 250: Performed by: ORTHOPAEDIC SURGERY

## 2021-07-07 PROCEDURE — 77030040356 HC CORD BPLR FRCP COVD -A: Performed by: ORTHOPAEDIC SURGERY

## 2021-07-07 RX ORDER — HYDROCODONE BITARTRATE AND ACETAMINOPHEN 5; 325 MG/1; MG/1
1 TABLET ORAL
Qty: 10 TABLET | Refills: 0 | Status: SHIPPED
Start: 2021-07-07 | End: 2021-07-10

## 2021-07-07 RX ORDER — FLUMAZENIL 0.1 MG/ML
0.2 INJECTION INTRAVENOUS
Status: DISCONTINUED | OUTPATIENT
Start: 2021-07-07 | End: 2021-07-07 | Stop reason: HOSPADM

## 2021-07-07 RX ORDER — HYDROMORPHONE HYDROCHLORIDE 1 MG/ML
.25-1 INJECTION, SOLUTION INTRAMUSCULAR; INTRAVENOUS; SUBCUTANEOUS
Status: DISCONTINUED | OUTPATIENT
Start: 2021-07-07 | End: 2021-07-07 | Stop reason: HOSPADM

## 2021-07-07 RX ORDER — SODIUM CHLORIDE, SODIUM LACTATE, POTASSIUM CHLORIDE, CALCIUM CHLORIDE 600; 310; 30; 20 MG/100ML; MG/100ML; MG/100ML; MG/100ML
125 INJECTION, SOLUTION INTRAVENOUS CONTINUOUS
Status: DISCONTINUED | OUTPATIENT
Start: 2021-07-07 | End: 2021-07-07 | Stop reason: HOSPADM

## 2021-07-07 RX ORDER — ONDANSETRON 2 MG/ML
INJECTION INTRAMUSCULAR; INTRAVENOUS AS NEEDED
Status: DISCONTINUED | OUTPATIENT
Start: 2021-07-07 | End: 2021-07-07 | Stop reason: HOSPADM

## 2021-07-07 RX ORDER — PROPOFOL 10 MG/ML
INJECTION, EMULSION INTRAVENOUS
Status: DISCONTINUED | OUTPATIENT
Start: 2021-07-07 | End: 2021-07-07 | Stop reason: HOSPADM

## 2021-07-07 RX ORDER — NALOXONE HYDROCHLORIDE 0.4 MG/ML
0.04 INJECTION, SOLUTION INTRAMUSCULAR; INTRAVENOUS; SUBCUTANEOUS
Status: DISCONTINUED | OUTPATIENT
Start: 2021-07-07 | End: 2021-07-07 | Stop reason: HOSPADM

## 2021-07-07 RX ORDER — SODIUM CHLORIDE 0.9 % (FLUSH) 0.9 %
5-40 SYRINGE (ML) INJECTION AS NEEDED
Status: DISCONTINUED | OUTPATIENT
Start: 2021-07-07 | End: 2021-07-07 | Stop reason: HOSPADM

## 2021-07-07 RX ORDER — MIDAZOLAM HYDROCHLORIDE 1 MG/ML
INJECTION, SOLUTION INTRAMUSCULAR; INTRAVENOUS AS NEEDED
Status: DISCONTINUED | OUTPATIENT
Start: 2021-07-07 | End: 2021-07-07 | Stop reason: HOSPADM

## 2021-07-07 RX ORDER — FENTANYL CITRATE 50 UG/ML
25 INJECTION, SOLUTION INTRAMUSCULAR; INTRAVENOUS
Status: DISCONTINUED | OUTPATIENT
Start: 2021-07-07 | End: 2021-07-07 | Stop reason: HOSPADM

## 2021-07-07 RX ORDER — LIDOCAINE HYDROCHLORIDE 10 MG/ML
INJECTION INFILTRATION; PERINEURAL AS NEEDED
Status: DISCONTINUED | OUTPATIENT
Start: 2021-07-07 | End: 2021-07-07 | Stop reason: HOSPADM

## 2021-07-07 RX ORDER — LIDOCAINE HYDROCHLORIDE 10 MG/ML
0.1 INJECTION, SOLUTION EPIDURAL; INFILTRATION; INTRACAUDAL; PERINEURAL AS NEEDED
Status: DISCONTINUED | OUTPATIENT
Start: 2021-07-07 | End: 2021-07-07 | Stop reason: HOSPADM

## 2021-07-07 RX ORDER — FENTANYL CITRATE 50 UG/ML
INJECTION, SOLUTION INTRAMUSCULAR; INTRAVENOUS AS NEEDED
Status: DISCONTINUED | OUTPATIENT
Start: 2021-07-07 | End: 2021-07-07 | Stop reason: HOSPADM

## 2021-07-07 RX ORDER — ALBUTEROL SULFATE 0.83 MG/ML
2.5 SOLUTION RESPIRATORY (INHALATION) AS NEEDED
Status: DISCONTINUED | OUTPATIENT
Start: 2021-07-07 | End: 2021-07-07 | Stop reason: HOSPADM

## 2021-07-07 RX ORDER — SODIUM CHLORIDE 0.9 % (FLUSH) 0.9 %
5-40 SYRINGE (ML) INJECTION EVERY 8 HOURS
Status: DISCONTINUED | OUTPATIENT
Start: 2021-07-07 | End: 2021-07-07 | Stop reason: HOSPADM

## 2021-07-07 RX ORDER — DIPHENHYDRAMINE HYDROCHLORIDE 50 MG/ML
12.5 INJECTION, SOLUTION INTRAMUSCULAR; INTRAVENOUS AS NEEDED
Status: DISCONTINUED | OUTPATIENT
Start: 2021-07-07 | End: 2021-07-07 | Stop reason: HOSPADM

## 2021-07-07 RX ORDER — ONDANSETRON 2 MG/ML
4 INJECTION INTRAMUSCULAR; INTRAVENOUS AS NEEDED
Status: DISCONTINUED | OUTPATIENT
Start: 2021-07-07 | End: 2021-07-07 | Stop reason: HOSPADM

## 2021-07-07 RX ADMIN — ONDANSETRON HYDROCHLORIDE 4 MG: 2 SOLUTION INTRAMUSCULAR; INTRAVENOUS at 13:09

## 2021-07-07 RX ADMIN — SODIUM CHLORIDE, POTASSIUM CHLORIDE, SODIUM LACTATE AND CALCIUM CHLORIDE 125 ML/HR: 600; 310; 30; 20 INJECTION, SOLUTION INTRAVENOUS at 10:50

## 2021-07-07 RX ADMIN — LIDOCAINE HYDROCHLORIDE 30 ML: 10 INJECTION, SOLUTION INFILTRATION; PERINEURAL at 12:08

## 2021-07-07 RX ADMIN — PROPOFOL 100 MCG/KG/MIN: 10 INJECTION, EMULSION INTRAVENOUS at 12:37

## 2021-07-07 RX ADMIN — CEFAZOLIN SODIUM 2 G: 1 POWDER, FOR SOLUTION INTRAMUSCULAR; INTRAVENOUS at 12:37

## 2021-07-07 RX ADMIN — FENTANYL CITRATE 100 MCG: 50 INJECTION, SOLUTION INTRAMUSCULAR; INTRAVENOUS at 12:02

## 2021-07-07 RX ADMIN — MIDAZOLAM 2 MG: 1 INJECTION, SOLUTION INTRAMUSCULAR; INTRAVENOUS at 12:02

## 2021-07-07 NOTE — ANESTHESIA PROCEDURE NOTES
Peripheral Block    Start time: 7/7/2021 12:02 PM  End time: 7/7/2021 12:08 PM  Performed by: Charles Rodriguez MD  Authorized by: Charles Rodriguez MD       Pre-procedure:    Indications: at surgeon's request and primary anesthetic    Preanesthetic Checklist: patient identified, risks and benefits discussed, site marked, timeout performed, anesthesia consent given and patient being monitored    Timeout Time: 12:02 EDT          Block Type:   Block Type:  Supraclavicular  Laterality:  Right  Monitoring:  Continuous pulse ox, frequent vital sign checks, heart rate, responsive to questions and oxygen  Injection Technique:  Single shot  Procedures: ultrasound guided and nerve stimulator    Patient Position: supine  Prep: chlorhexidine    Location:  Supraclavicular  Needle Type:  Stimuplex  Needle Gauge:  22 G  Needle Localization:  Anatomical landmarks, ultrasound guidance and nerve stimulator    Assessment:  Number of attempts:  1  Injection Assessment:  Incremental injection every 5 mL, local visualized surrounding nerve on ultrasound, negative aspiration for blood, no paresthesia and no intravascular symptoms  Patient tolerance:  Patient tolerated the procedure well with no immediate complications

## 2021-07-07 NOTE — DISCHARGE INSTRUCTIONS
MD Dr. Bentley Raines Dr., MD Dr. Orysia Daubs. Lakia Haney MD    You have undergone surgery by one of our hand specialists. Please follow these instructions to ensure a safe and speedy recovery. 1. SURGICAL DRESSING (bandage): Your bandage should be kept dry and in place until you return to the office for your follow up visit. This helps to guard against infection. [x]         You can shower if you place a plastic bag over your dressing.    []         You will need to sponge bathe until you are seen in the office. 2. ELEVATION:  It is VERY IMPORTANT that you keep your arm and hand above the level of your heart at all times, awake or asleep. The higher you elevate your hand, the less it will swell, and the less it will hurt. It is best to elevate the hand as shown below:              Laying down, especially at night,  with your arm elevated on pillows help keep your shoulder and elbow from getting stiff. 3. Ice bags / ice packs can be used to help control pain. If you make your own ice bag, double-bagging helps to prevent leaks. 4. MEDICATIONS:  You have been given a prescription for pain medications. Take it according to the instructions on the bottle. DO NOT drink alcohol while taking pain medications. DO NOT drive, operate heavy machinery, or make important personal or business decisions since the medications will make you drowsy. We do NOT refill prescriptions over the weekend. Please arrange to call for prescription refills during regular office hours. PRESCRIPTION SENT TO:     Elena Bartholomew:  [address]      5. APPOINTMENT:  Your post operative appointment has been made for the following time:    TIME:   8:45am          DATE:   7/20/21         At the:   [x]  [facility]     6.  AFTER GENERAL ANESTHESIA or IV SEDATION:   DO NOT drink alcohol or drive, work around [place] [address], or make important personal or business decisions. Limit your activity for 24 hours. Resume your diet with light foods (Jell-o ®, clear soups, clear fluids, caffeine-free drinks). If you do not have any nausea, you may resume your regular diet. We at [address] [place] [address] want your surgery and recovery to be as comfortable and successful as possible. Should you have problems, please call our office during the morning hours. In particular, call if your pain is not adequately controlled by prescribed medication, temperature is over 101 degrees, or your bandage is wet or has a four odor. Your doctor contact information:   [facility] [phone]  Hayes Koo, ext [phone] [facility] - [address] [facility], [address]  [place], [address] [place] [address] SUMMARY from your Nurse      PATIENT INSTRUCTIONS    After general anesthesia or intravenous sedation, for 24 hours or while taking prescription Narcotics:  · Limit your activities  · Do not drive and operate hazardous machinery  · Do not make important personal or business decisions  · Do  not drink alcoholic beverages  · If you have not urinated within 8 hours after discharge, please contact your surgeon on call. Report the following to your surgeon:  · Excessive pain, swelling, redness or odor of or around the surgical area  · Temperature over 100.5  · Nausea and vomiting lasting longer than 4 hours or if unable to take medications  · Any signs of decreased circulation or nerve impairment to extremity: change in color, persistent  numbness, tingling, coldness or increase pain  · Any questions      GOOD HELP TO FIGHT AN INFECTION  Here are a few tip to help reduce the chance of getting an infection after surgery:   Wash Your Hands   Good handwashing is the most important thing you and your caregiver can do.  Wash before and after caring for any wounds. Dry your hand with a clean towel.    Wash with soap and water for at least 20 seconds. A TIP: sing the \"Happy Birthday\" song through one time while washing to help with the timing.  Use a hand  in between washings.  Shower   When your surgeon says it is OK to take a shower, use a new bar of antibacterial soap (if that is what you use, and keep that bar of soap ONLY for your use), or antibacterial body wash.  Use a clean wash cloth or sponge when you bathe.  Dry off with a clean towel  after every bath - be careful around any wounds, skin staples, sutures or surgical glue over/on wounds.  Do not enter swimming pools, hot tubs, lakes, rivers and/or ocean until wounds are healed and your doctor/surgeon says it is OK.  Use Clean Sheets   Sleep on freshly laundered sheets after your surgery.  Keep the surgery site covered with a clean, dry bandage (if instructed to do so). If the bandage becomes soiled, reapply a new, dry, clean bandage.  Do not allow pets to sleep with you while your wound is healing.  Lifestyle Modification and Controlling Your Blood Sugar   Smoking slows wound healing. Stop smoking and limit exposure to second-hand smoke.  High blood sugar slows wound healing. Eat a well-balanced diet to provide proper nutrition while healing   Monitor your blood sugar (if you are a diabetic) and take your medications as you are suppose to so you can control you blood sugar after surgery. COUGH AND DEEP BREATHE    Breathing deeply and coughing are very important exercises to do after surgery. Deep breathing and coughing open the little air tubes and air sacks in your lungs. You take deep breaths every day. You may not even notice - it is just something you do when you sigh or yawn. It is a natural exercise you do to keep these air passages open. After surgery, take deep breaths and cough, on purpose. DIRECTIONS:  · Take 10 to 15 slow deep breaths every hour while awake.   · Breathe in deeply, and hold it for 2 seconds. · Exhale slowly through puckered lips, like blowing up a balloon. · After every 4th or 5th deep breath, hug your pillow to your chest or belly and give a hard, deep cough. Yes, it will probably hurt. But doing this exercise is a very important part of healing after surgery. Take your pain medicine to help you do this exercise without too much pain. Coughing and deep breathing help prevent bronchitis and pneumonia after surgery. If you had chest or belly surgery, use a pillow as a \"hug alfonso\" and hold it tightly to your chest or belly when you cough. ANKLE PUMPS    Ankle pumps increase the circulation of oxygenated blood to your lower extremities and decrease your risk for circulation problems such as blood clots. They also stretch the muscles, tendons and ligaments in your foot and ankle, and prevent joint contracture in the ankle and foot, especially after surgeries on the legs. It is important to do ankle pump exercises regularly after surgery because immobility increases your risk for developing a blood clot. Your doctor may also have you take an Aspirin for the next few days as well. If your doctor did not ask you to take an Aspirin, consult with him before starting Aspirin therapy on your own. The exercise is quite simple. · Slowly point your foot forward, feeling the muscles on the top of your lower leg stretch, and hold this position for 5 seconds. · Next, pull your foot back toward you as far as possible, stretching the calf muscles, and hold that position for 5 seconds. · Repeat with the other foot. · Perform 10 repetitions every hour while awake for both ankles if possible (down and then up with the foot once is one repetition). You should feel gentle stretching of the muscles in your lower leg when doing this exercise. If you feel pain, or your range of motion is limited, don't push too hard.   Only go the limit your joint and muscles will let you go. If you have increasing pain, progressively worsening leg warmth or swelling, STOP the exercise and call your doctor. MEDICATION AND   SIDE EFFECT GUIDE    The [facility] MEDICATION AND SIDE EFFECT GUIDE was provided to the PATIENT AND CARE PROVIDER. Information provided includes instruction about drug purpose and common side effects for the following medications:   · [facility]      Patient Education        Carpal Tunnel Release: What to Expect at [facility] tunnel reduces the pressure on a nerve in the wrist. Your doctor cut a ligament that presses on the nerve. This lets the nerve pass freely through the tunnel without being squeezed. Your hand will hurt and may feel weak with some numbness. This usually goes away in a few days, but it may take several months. Your doctor may remove the large bandage, or he or she will tell you when and how to remove it yourself. In some cases, you may have a splint. If you have one, you will wear it for about 2 weeks. Your doctor will take out your stitches in 1 to 2 weeks. Your hand and wrist may feel worse than they used to feel. But the pain should start to go away. It usually takes 3 to 4 months to recover and up to 1 year before hand strength returns. How much strength returns will vary. The timing of your return to work depends on the type of surgery you had, whether the surgery was on your dominant hand (the hand you use most), and your work activities. If you had open surgery on your dominant hand and you do repeated actions at work, you may be able to go back to work in 10 to 8 weeks. Repeated motions include typing or assembly-line work. If the surgery was on the other hand and you don't do repeated actions at work, you may be able to return to work in 9 to 14 days. If you had endoscopic surgery, you may be able to go back to work sooner than with open surgery.   This care sheet gives you a general idea about how long it will take for you to recover. But each person recovers at a different pace. Follow the steps below to get better as quickly as possible. How can you care for yourself at home? Activity    · Rest when you feel tired. Getting enough sleep will help you recover.     · Try to walk each day. Start by walking a little more than you did the day before. Bit by bit, increase the amount you walk.     · For up to 2 weeks after surgery, avoid lifting things heavier than 1 to 2 pounds and using your hand. This includes doing repeated arm or hand movements, such as typing or using a computer mouse, washing windows, vacuuming, or chopping food. Do not use power tools, and avoid activities that cause vibration.     · You may do heavier tasks about 4 weeks after surgery. These include vacuuming, mowing the lawn, and gardening.     · You may shower 24 to 48 hours after surgery, if your doctor okays it. Keep your bandage dry by taping a sheet of plastic to cover it. If you have a splint, keep it dry. Your doctor will tell you if you can remove it when you shower. Be careful not to put the splint on too tight. Do not take a bath until the incision heals, or until your doctor tells you it is okay.     · You may drive when you are fully able to use your hand. Diet    · You can eat your normal diet. If your stomach is upset, try bland, low-fat foods like plain rice, broiled chicken, toast, and yogurt. Medicines    · Your doctor will tell you if and when you can restart your medicines. He or she will also give you instructions about taking any new medicines.     · If you take aspirin or some other blood thinner, ask your doctor if and when to start taking it again. Make sure that you understand exactly what your doctor wants you to do.     · Take pain medicines exactly as directed. ? If the doctor gave you a prescription medicine for pain, take it as prescribed.   ? If you are not taking a prescription pain medicine, take an over-the-counter medicine such as acetaminophen (Tylenol), ibuprofen (Advil, Motrin), or naproxen (Aleve). Read and follow all instructions on the label. ? Do not take two or more pain medicines at the same time unless the doctor told you to. Many pain medicines have acetaminophen, which is Tylenol. Too much acetaminophen (Tylenol) can be harmful.     · If you think your pain medicine is making you sick to your stomach:  ? Take your medicine after meals (unless your doctor has told you not to). ? Ask your doctor for a different pain medicine.     · If your doctor prescribed antibiotics, take them as directed. Do not stop taking them just because you feel better. You need to take the full course of antibiotics. Incision and splint care    · Keep your bandage dry. If it gets dirty, you may change it.     · If you have a splint, talk to your doctor about when you should wear it. Exercise    · You may need wrist and hand rehabilitation. This is a series of exercises you do after your surgery. This helps you get back your wrist's and hand's range of motion, strength, and . You will work with your doctor and physical or occupational therapist to plan this exercise program. To get the best results, you need to do the exercises correctly and as often and as long as your doctor tells you. Ice and elevation    · Put ice or a cold pack on your wrist for 10 to 20 minutes at a time. Try to do this every 1 to 2 hours for the next 3 days (when you are awake) or until the swelling goes down. Put a thin cloth between the ice and your skin.     · Prop up the sore wrist on a pillow when you ice it or anytime you sit or lie down during the next 3 days. Try to keep it above the level of your heart. This will help reduce swelling. Other instructions    · Avoid letting your hand hang down. This can cause swelling. Follow-up care is a key part of your treatment and safety.  Be sure to make and go to all appointments, and call your doctor if you are having problems. It's also a good idea to know your test results and keep a list of the medicines you take. When should you call for help? Call 911 anytime you think you may need emergency care. For example, call if:    · You passed out (lost consciousness).     · You have chest pain, are short of breath, or cough up blood. Call your doctor now or seek immediate medical care if:    · You have pain that does not get better after you take pain medicine.     · Your hand is cool or pale or changes color.     · Your cast or splint feels too tight.     · You have tingling, weakness, or numbness in your hand or fingers.     · You are sick to your stomach or cannot drink fluids.     · You have loose stitches, or your incision comes open.     · You have signs of a blood clot in your leg (called a deep vein thrombosis), such as:  ? Pain in your calf, back of the knee, thigh, or groin. ? Redness or swelling in your leg.     · You have signs of infection, such as:  ? Increased pain, swelling, warmth, or redness. ? Red streaks leading from the incision. ? Pus draining from the incision. ? A fever.     · Bright red blood has soaked through the bandage over your incision. Watch closely for any changes in your health, and be sure to contact your doctor if:    · You have a problem with your cast or splint.     · You do not get better as expected. Where can you learn more? Go to http://www.gray.com/  Enter N388 in the search box to learn more about \"Carpal Tunnel Release: What to Expect at Home. \"  Current as of: November 16, 2020               Content Version: 12.8  © 3368-1883 [employer]. Care instructions adapted under license by [employer] (which disclaims liability or warranty for this information).  If you have questions about a medical condition or this instruction, always ask your healthcare professional. [employer] disclaims any warranty or liability for your use of this information. These are general instructions for a healthy lifestyle:    *   Please give a list of your current medications to your Primary Care Provider. *   Please update this list whenever your medications are discontinued, doses are changed, or new medications (including over-the-counter products) are added. *   Please carry medication information at all times in case of emergency situations. About Smoking  No smoking / No tobacco products  Avoid exposure to second hand smoke     Surgeon General's Warning:  Quitting smoking now greatly reduces serious risk to your health. Obesity, smoking, and sedentary lifestyle greatly increases your risk for illness and disease. A healthy diet, regular physical exercise & weight monitoring are important for maintaining a healthy lifestyle. Congestive Heart Failure  You may be retaining fluid if you have a history of heart failure or if you experience any of the following symptoms:  Weight gain of 3 pounds or more overnight or 5 pounds in a week, increased swelling in your hands or feet or shortness of breath while lying flat in bed. Please call your doctor as soon as you notice any of these symptoms; do not wait until your next office visit. Recognize signs and symptoms of STROKE:  F -  Face looks uneven  A -  Arms unable to move or move evenly  S -  Speech slurred or non-existent  T -  Time-call 911 as soon as signs and symptoms begin-DO NOT go          back to bed or wait to see if you get better-TIME IS BRAIN. Warning Signs of HEART ATTACK   Call 911 if you have these symptoms:     Chest discomfort. Most heart attacks involve discomfort in the center of the chest that lasts more than a few minutes, or that goes away and comes back. It can feel like uncomfortable pressure, squeezing, fullness, or pain.  Discomfort in other areas of the upper body.  Symptoms can include pain or discomfort in one or both arms, the back, neck, jaw, or stomach.  Shortness of breath with or without chest discomfort.  Other signs may include breaking out in a cold sweat, nausea, or lightheadedness. Don't wait more than five minutes to call 911 - MINUTES MATTER! Fast action can save your life. Calling 911 is almost always the fastest way to get lifesaving treatment. Emergency Medical Services staff can begin treatment when they arrive -- up to an hour sooner than if someone gets to the hospital by car. Learning About Coronavirus [phone])  Coronavirus [phone]): Overview  What is coronavirus (COVID-19)? The coronavirus disease (COVID-19) is caused by a virus. It is an illness that was first found in [place], [place], in December 2019. It has since spread worldwide. The virus can cause fever, cough, and trouble breathing. In severe cases, it can cause pneumonia and make it hard to breathe without help. It can cause death. Coronaviruses are a large group of viruses. They cause the common cold. They also cause more serious illnesses like Middle East respiratory syndrome (MERS) and severe acute respiratory syndrome (SARS). COVID-19 is caused by a novel coronavirus. That means it's a new type that has not been seen in people before. This virus spreads person-to-person through droplets from coughing and sneezing. It can also spread when you are close to someone who is infected. And it can spread when you touch something that has the virus on it, such as a doorknob or a tabletop. What can you do to protect yourself from coronavirus (COVID-19)? The best way to protect yourself from getting sick is to:  · Avoid areas where there is an outbreak. · Avoid contact with people who may be infected. · Wash your hands often with soap or alcohol-based hand sanitizers. · Avoid crowds and try to stay at least 6 feet away from other people. · Wash your hands often, especially after you cough or sneeze. Use soap and water, and scrub for at least 20 seconds. If soap and water aren't available, use an alcohol-based hand . · Avoid touching your mouth, nose, and eyes. What can you do to avoid spreading the virus to others? To help avoid spreading the virus to others:  · Cover your mouth with a tissue when you cough or sneeze. Then throw the tissue in the trash. · Use a disinfectant to clean things that you touch often. · Stay home if you are sick or have been exposed to the virus. Don't go to school, work, or public areas. And don't use public transportation. · If you are sick:  ? Leave your home only if you need to get medical care. But call the doctor's office first so they know you're coming. And wear a face mask, if you have one.  ? If you have a face mask, wear it whenever you're around other people. It can help stop the spread of the virus when you cough or sneeze. ? Clean and disinfect your home every day. Use household  and disinfectant wipes or sprays. Take special care to clean things that you grab with your hands. These include doorknobs, remote controls, phones, and handles on your refrigerator and microwave. And don't forget countertops, tabletops, bathrooms, and computer keyboards. When to call for help  Call 911 anytime you think you may need emergency care. For example, call if:  · You have severe trouble breathing. (You can't talk at all.)  · You have constant chest pain or pressure. · You are severely dizzy or lightheaded. · You are confused or can't think clearly. · Your face and lips have a blue color. · You pass out (lose consciousness) or are very hard to wake up. Call your doctor now if you develop symptoms such as:  · Shortness of breath. · Fever. · Cough. If you need to get care, call ahead to the doctor's office for instructions before you go. Make sure you wear a face mask, if you have one, to prevent exposing other people to the virus.   Where can you get the latest information? The following health organizations are tracking and studying this virus. Their websites contain the most up-to-date information. Evi Riddle also learn what to do if you think you may have been exposed to the virus. · U.S. Centers for Disease Control and Prevention (CDC): The CDC provides updated news about the disease and travel advice. The website also tells you how to prevent the spread of infection. www.cdc.gov  · World Health Organization [facility]): WHO offers information about the virus outbreaks. WHO also has travel advice. www.who.int  Current as of: April 1, 2020               Content Version: 12.4  © 0911-6349 [employer]. Care instructions adapted under license by your healthcare professional. If you have questions about a medical condition or this instruction, always ask your healthcare professional. [address] any warranty or liability for your use of this information. The discharge information has been reviewed with the patient and family member. Any questions and concerns from the patient and family member have been addressed. The patient and family member verbalized understanding.         CONTENTS FOUND IN YOUR DISCHARGE ENVELOPE:  [x]     Surgeon and Hospital Discharge Instructions  [x]     [facility] Care Provider Card  [x]     Medication & Side Effect Guide            (your newly prescribed medications have been marked/highlighted showing the most common side effects from   the classes of drugs on your prescriptions)  [x]     Medication Prescription(s) x 1 ([address])( [x] These have been sent electronically to your pharmacy ([facility]) by your surgeon,   - OR -       your surgeon has already provided these to you during a previous/pre-op office visit)  []     [address]  []     Physical Therapy Prescription  [x]     Follow-up Appointment Cards   []     Surgery-related Pictures/Media  [x]     Pain block and/or block with On-Q Catheter from Anesthesia Service (information included in your instructions above)  []     Medical device information sheets/pamphlets from their    []     School/work excuse note. []     /parent work excuse note. The following personal items collected during your admission are returned to you:   Dental Appliance: Dental Appliances: Uppers, Partials (full upper and bottom partial)  Vision: Visual Aid: None  Hearing Aid:    Jewelry: Jewelry: None  Clothing: Clothing: Footwear, Undergarments, Pants, Shirt  Other Valuables:  Other Valuables: Li Milian, Cell Phone (states no money or credit cards in wallet ok to put in PACU)  Valuables sent to safe:  n/a

## 2021-07-07 NOTE — ANESTHESIA POSTPROCEDURE EVALUATION
Procedure(s):  RIGHT ENDOSCOPIC VERSUS OPEN CARPAL TUNNEL RELEASE (REG, BLOCK, W/MAC). regional    Anesthesia Post Evaluation        Patient location during evaluation: bedside  Level of consciousness: awake  Pain management: satisfactory to patient  Airway patency: patent  Anesthetic complications: no  Cardiovascular status: acceptable  Respiratory status: acceptable  Hydration status: acceptable        INITIAL Post-op Vital signs:   Vitals Value Taken Time   BP 95/82 07/07/21 1405   Temp 36.4 °C (97.6 °F) 07/07/21 1319   Pulse 86 07/07/21 1409   Resp 16 07/07/21 1409   SpO2 99 % 07/07/21 1409   Vitals shown include unvalidated device data.

## 2021-07-07 NOTE — BRIEF OP NOTE
Brief Postoperative Note    Patient: Lupe Foote  YOB: 1970  MRN: [id number]    Date of Procedure: 7/7/2021     Pre-Op Diagnosis: CARPAL TUNNEL SYNDROME ON RIGHT    Post-Op Diagnosis: Same as preoperative diagnosis. Procedure(s):  RIGHT ENDOSCOPIC VERSUS OPEN CARPAL TUNNEL RELEASE (REG, BLOCK, W/MAC)    Surgeon(s):   Marge Petty MD    Surgical Assistant: Surg Asst-1: Rajan Matos RN    Anesthesia: Regional     Estimated Blood Loss (mL): Minimal    Complications: None    Specimens: * No specimens in log *     Implants: * No implants in log *    Drains: * No LDAs found *    Findings: compression right median nerve    Electronically Signed by Tee Bass MD on 7/7/2021 at 12:48 PM

## 2021-07-07 NOTE — OP NOTES
[facility] London[facility]  OPERATIVE REPORT    Name:  Omero Moya  MR#:  [id number]  :  1970  ACCOUNT #:  [de-identified]  DATE OF SERVICE:  2021    PREOPERATIVE DIAGNOSIS:  Right carpal tunnel syndrome. POSTOPERATIVE DIAGNOSIS:  Right carpal tunnel syndrome. PROCEDURE PERFORMED:  Right endoscopic carpal tunnel release. SURGEON:  Mell Montague MD    ASSISTANT:  None. ANESTHESIA:  Axillary block. COMPLICATIONS:  None. SPECIMENS REMOVED:  none. IMPLANTS:  none. ESTIMATED BLOOD LOSS:  Zero. TOURNIQUET TIME:  12 minutes. INDICATIONS:  The patient is a 41-year-old female with a history of numbness, tingling and pain in her right hand. She was found to have significant carpal tunnel syndrome on nerve testing. She was counseled regarding surgical and nonsurgical treatment and elected to proceed with the procedure as above. The risks and benefits were discussed in detail. PROCEDURE:  The patient was taken to the operating room and placed supine on the operating table. Following administration of axillary block anesthetic, the right arm prepped and draped in sterile fashion with Hibiclens and alcohol. Tourniquet applied to the right proximal arm. The arm was exsanguinated with an Esmarch bandage and the tourniquet inflated to 250 mmHg. An incision was made in the distal forearm parallel to the volar wrist crease transversely. Subcutaneous dissection was carried out in the palmaris longus and tendon retracted. Distal forearm fascia was released exposing the median nerve. Sequential dilators were placed along the nerve beneath the transverse carpal ligament but it was difficult to insert the largest of the dilators. Due to the difficulty with this, it was felt that a second incision in the palm would be created. An incision along the radial aspect of the ring ray just proximal to the Ashley's cardinal line was created longitudinally.   Subcutaneous dissection was carried out on the palmar fascia, divided longitudinally in line with the skin incision. The transverse carpal ligament was identified and released distally exposing the median nerve. With care taken to protect the median nerve, using a [place] elevator and instrumentation, the endoscopic blade was then used to divide the ligament from distal to proximal without difficulty. Complete release was confirmed and the nerve was noted to be flat and hyperemic at the level of the canal.  The wound was copiously irrigated with sterile saline and hemostasis was achieved with bipolar cautery. Both wounds were then repaired using 5-0 Prolene subcuticular and benzoin and Steri-Strips. A sterile bulky soft hand dressing was applied and the tourniquet let down. The patient awakened from anesthesia and discharged to the recovery room in stable condition. DISCHARGE PLAN:  The patient was instructed to keep arm elevated, clean and dry at all times, to call with any questions or concerns and follow up in 10 days for suture removal, wound check, and hand therapy referral if indicated. The patient is given a prescription for hydrocodone for pain control.       Yennifer Villegas MD      [id number]  D:  07/07/2021 15:16  T:  07/07/2021 19:19  JOB #:  [id number]

## 2021-07-07 NOTE — ANESTHESIA PREPROCEDURE EVALUATION
Relevant Problems   No relevant active problems       Anesthetic History   No history of anesthetic complications            Review of Systems / Medical History  Patient summary reviewed, nursing notes reviewed and pertinent labs reviewed    Pulmonary  Within defined limits                 Neuro/Psych   Within defined limits           Cardiovascular  Within defined limits                  Comments: Not on Beta Blocker   GI/Hepatic/Renal  Within defined limits              Endo/Other  Within defined limits           Other Findings              Physical Exam    Airway  Mallampati: II  TM Distance: 4 - 6 cm  Neck ROM: normal range of motion   Mouth opening: Normal     Cardiovascular  Regular rate and rhythm,  S1 and S2 normal,  no murmur, click, rub, or gallop  Rhythm: regular  Rate: normal         Dental  No notable dental hx       Pulmonary  Breath sounds clear to auscultation               Abdominal  GI exam deferred       Other Findings            Anesthetic Plan    ASA: 2  Anesthesia type: regional - supraclavicular block            Anesthetic plan and risks discussed with: Patient

## 2021-07-07 NOTE — PERIOP NOTES
Discharge instructions/education reviewed with pt/pt's son with understanding verbalized. Pt escorted off the unit with a w/c and in NAD. Home with son who is driving. Pt's son signed hard copy discharge form.

## 2021-09-17 ENCOUNTER — OFFICE VISIT (OUTPATIENT)
Dept: PRIMARY CARE CLINIC | Age: 51
End: 2021-09-17
Payer: COMMERCIAL

## 2021-09-17 VITALS
BODY MASS INDEX: 37.92 KG/M2 | HEART RATE: 71 BPM | SYSTOLIC BLOOD PRESSURE: 127 MMHG | RESPIRATION RATE: 16 BRPM | HEIGHT: 68 IN | TEMPERATURE: 97.6 F | DIASTOLIC BLOOD PRESSURE: 77 MMHG | WEIGHT: 250.2 LBS | OXYGEN SATURATION: 100 %

## 2021-09-17 DIAGNOSIS — F31.9 BIPOLAR DISORDER WITH DEPRESSION (HCC): ICD-10-CM

## 2021-09-17 DIAGNOSIS — Z12.31 ENCOUNTER FOR SCREENING MAMMOGRAM FOR MALIGNANT NEOPLASM OF BREAST: ICD-10-CM

## 2021-09-17 DIAGNOSIS — M05.79 RHEUMATOID ARTHRITIS INVOLVING MULTIPLE SITES WITH POSITIVE RHEUMATOID FACTOR (HCC): ICD-10-CM

## 2021-09-17 DIAGNOSIS — I10 ESSENTIAL HYPERTENSION: ICD-10-CM

## 2021-09-17 DIAGNOSIS — R07.89 COSTOCHONDRAL CHEST PAIN: Primary | ICD-10-CM

## 2021-09-17 PROCEDURE — 99214 OFFICE O/P EST MOD 30 MIN: CPT | Performed by: FAMILY MEDICINE

## 2021-09-17 RX ORDER — NAPROXEN 500 MG/1
500 TABLET ORAL 2 TIMES DAILY WITH MEALS
Qty: 20 TABLET | Refills: 0 | Status: SHIPPED | OUTPATIENT
Start: 2021-09-17 | End: 2022-01-10

## 2021-09-17 NOTE — PROGRESS NOTES
Subjective:     Chief Complaint   Patient presents with    Breast pain     L side breast pain that radiates around to back . Has been going on for around 2 weeks , can be hard to breathe at times        She  is a 46y.o. year old female with hx of RA arthritis , bipolar disorder who presents for evaluation of pain in her chest. She is new to me but established in office. Has been having pain inside her left breast with a radiation to her back for two weeks. She reports that about two weeks ago she was helping a women to put her socks on and then she started having pain. Taking deep breath hurts. No other injury. Denies any cough, fever, chills. She reports that today she is little better. Has been taking ibuprofen 800 mg and tylenol but its not working. She has been following up with psychiatrist for psych management. Has been following up with orthopedics and rheumatologist Dr. Adalgisa Miranda for RA      Pertinent items are noted in HPI. Objective:     Vitals:    09/17/21 1119   BP: 127/77   Pulse: 71   Resp: 16   Temp: 97.6 °F (36.4 °C)   TempSrc: Temporal   SpO2: 100%   Weight: 250 lb 3.2 oz (113.5 kg)   Height: 5' 8\" (1.727 m)       Physical Examination: General appearance - alert, well appearing, and in no distress, oriented to person, place, and time and overweight  Mental status - alert, oriented to person, place, and time, normal mood, behavior, speech, dress, motor activity, and thought processes  Chest - clear to auscultation, no wheezes, rales or rhonchi, symmetric air entry, chest wall tenderness noted in both side.    Heart - normal rate, regular rhythm, normal S1, S2, no murmurs, rubs, clicks or gallops  Breasts - breasts appear normal, no suspicious masses, no skin or nipple changes or axillary nodes    Allergies   Allergen Reactions    Methotrexate Other (comments)     Mouth and lip blisters    Percocet [Oxycodone-Acetaminophen] Other (comments)     Gives her the \"creepy crawlies\" Social History     Socioeconomic History    Marital status: SINGLE     Spouse name: Not on file    Number of children: Not on file    Years of education: Not on file    Highest education level: Not on file   Tobacco Use    Smoking status: Former Smoker     Packs/day: 1.00     Years: 35.00     Pack years: 35.00     Quit date:      Years since quittin.7    Smokeless tobacco: Former User   Vaping Use    Vaping Use: Every day    Substances: Flavoring    Devices: Refillable tank   Substance and Sexual Activity    Alcohol use: Yes     Alcohol/week: 0.0 - 1.0 standard drinks    Drug use: Yes     Types: Marijuana     Comment: medical marijuana license in [place]/ helps with her hurting so back and panic attacks, needs [facility]    Other Topics Concern     Social Determinants of Health     Financial Resource Strain:     Difficulty of Paying Living Expenses:    Food Insecurity:     Worried About Running Out of Food in the Last Year:     [address] in the Last Year:    Transportation Needs:     Lack of Transportation (Medical):      Lack of Transportation (Non-Medical):    Physical Activity:     Days of Exercise per Week:     Minutes of Exercise per Session:    Stress:     Feeling of Stress :    Social Connections:     Frequency of Communication with Friends and Family:     Frequency of Social Gatherings with Friends and Family:     Attends Confucianist Services:     Active Member of Clubs or Organizations:     Attends Club or Organization Meetings:     Marital Status:       Family History   Problem Relation Age of Onset    Osteoporosis Mother     Arthritis Mother     Dementia Mother     Psychiatric Disorder Mother    Wanda Roth Mother     Diabetes Father     Heart Disease Father     Cancer Father     Other Father     Arthritis-rheumatoid Sister     Gout Maternal Grandmother     Arthritis-rheumatoid Paternal Aunt     Lupus Paternal Uncle       Past Surgical History: Procedure Laterality Date    HX CARPAL TUNNEL RELEASE      HX CHOLECYSTECTOMY N/A 2004    HX TOTAL ABDOMINAL HYSTERECTOMY N/A 1997      Past Medical History:   Diagnosis Date    Anxiety     Bipolar affect, depressed ([address]     Chronic joint pain     COVID-19 03/2021    GERD (gastroesophageal reflux disease)     PTSD (post-traumatic stress disorder)     RA (rheumatoid arthritis) ([facility])       Current Outpatient Medications   Medication Sig Dispense Refill    pyridoxine HCl, vitamin B6, (VITAMIN B-6 PO) Take 100 mg by mouth two (2) times a day.  cyanocobalamin (Vitamin B-12) 1,000 mcg/mL injection 1,000 mcg by IntraMUSCular route every thirty (30) days.  gabapentin (NEURONTIN) 600 mg tablet Take 1 Tab by mouth three (3) times daily. Max Daily Amount: 1,800 mg. 90 Tab 3    venlafaxine-SR (Effexor XR) 150 mg capsule Take 1 Cap by mouth daily. 90 Cap 1    ARIPiprazole (ABILIFY) 10 mg tablet Take 1 Tab by mouth daily. Take 10 mg by mouth daily. 90 Tab 0    folic acid (FOLVITE) 1 mg tablet Take 1 Tab by mouth daily. Take  by mouth daily. 30 Tab 5    buPROPion SR (WELLBUTRIN, ZYBAN) 200 mg SR tablet Take 1 Tab by mouth two (2) times a day. Take 200 mg by mouth two (2) times a day. 180 Tab 0    propranoloL (INDERAL) 10 mg tablet Take 1 Tab by mouth three (3) times daily. 270 Tab 0    famotidine (PEPCID) 20 mg tablet Take 1 Tab by mouth two (2) times a day. 180 Tab 0    traZODone (DESYREL) 150 mg tablet Take 1 Tab by mouth nightly. 90 Tab 0    hydroCHLOROthiazide (HYDRODIURIL) 50 mg tablet Take 1 Tab by mouth daily. 90 Tab 1    leflunomide (ARAVA) 20 mg tablet Take 1 Tab by mouth daily. Take half tab daily for 7 days and then one tab daily if tolerated 30 Tab 5    dextroamphetamine-amphetamine (ADDERALL) 20 mg tablet Take 20 mg by mouth two (2) times a day. Assessment/ Plan:   Diagnoses and all orders for this visit:    1.  Costochondral chest pain  -     naproxen (NAPROSYN) 500 mg tablet; Take 1 Tablet by mouth two (2) times daily (with meals). No imaging warranted. Discussed warning signs when to seek medical attention. Warm compression. Advised to follow up with her rheumatologist for arthritis. 2. Encounter for screening mammogram for malignant neoplasm of breast  -     [facility] 3D MARLA W MAMMO BI SCREENING INCL CAD; Future    3. Essential hypertension       BP well controlled with HCTZ. 4. Rheumatoid arthritis involving multiple sites with positive rheumatoid factor ([facility] [address]      Follow up with Dr. Brenna Fierro. 5. Bipolar disorder with depression [facility])      Per psychiatrist.          Medication risks/benefits/costs/interactions/alternatives discussed with patient. Advised patient to call back or return to office if symptoms worsen/change/persist. If patient cannot reach us or should anything more severe/urgent arise he/she should proceed directly to the nearest emergency department. Discussed expected course/resolution/complications of diagnosis in detail with patient. Patient given a written after visit summary which includes her diagnoses, current medications and vitals. Patient expressed understanding with the diagnosis and plan.

## 2021-09-17 NOTE — PROGRESS NOTES
Identified pt with two pt identifiers(name and ). Chief Complaint   Patient presents with    Breast pain     L side breast pain that radiates around to back . Has been going on for around 2 weeks , can be hard to breathe at times        3 most recent PHQ Screens 2021   PHQ Not Done -   Little interest or pleasure in doing things Not at all   Feeling down, depressed, irritable, or hopeless Not at all   Total Score PHQ 2 0        Vitals:    21 1119   BP: 127/77   Pulse: 71   Resp: 16   Temp: 97.6 °F (36.4 °C)   TempSrc: Temporal   SpO2: 100%   Weight: 250 lb 3.2 oz (113.5 kg)   Height: 5' 8\" (1.727 m)   PainSc:   5   PainLoc: Breast       Health Maintenance Due   Topic    COVID-19 Vaccine (1)    DTaP/Tdap/Td series (1 - Tdap)    Lipid Screen     Colorectal Cancer Screening Combo     Shingrix Vaccine Age 50> (1 of 2)    Low dose CT lung screening     Breast Cancer Screen Mammogram     Flu Vaccine (1)       1. Have you been to the ER, urgent care clinic since your last visit? Hospitalized since your last visit? No    2. Have you seen or consulted any other health care providers outside of the [address] [place] [address] since your last visit? Include any pap smears or colon screening.  Physciatrist - [facility] health alternatives

## 2021-10-19 DIAGNOSIS — Z79.899 ONGOING USE OF POSSIBLY TOXIC MEDICATION: Primary | ICD-10-CM

## 2021-10-19 DIAGNOSIS — M06.00 SERONEGATIVE RHEUMATOID ARTHRITIS (HCC): ICD-10-CM

## 2021-10-19 DIAGNOSIS — M47.816 LUMBAR FACET ARTHROPATHY: ICD-10-CM

## 2021-10-20 RX ORDER — LEFLUNOMIDE 20 MG/1
TABLET ORAL
Qty: 30 TABLET | Refills: 0 | OUTPATIENT
Start: 2021-10-20

## 2021-12-31 LAB
ALBUMIN SERPL-MCNC: 3.9 G/DL (ref 3.8–4.9)
ALBUMIN/GLOB SERPL: 1.5 {RATIO} (ref 1.2–2.2)
ALP SERPL-CCNC: 77 IU/L (ref 44–121)
ALT SERPL-CCNC: 15 IU/L (ref 0–32)
AST SERPL-CCNC: 10 IU/L (ref 0–40)
BASOPHILS # BLD AUTO: 0 X10E3/UL (ref 0–0.2)
BASOPHILS NFR BLD AUTO: 1 %
BILIRUB SERPL-MCNC: 0.3 MG/DL (ref 0–1.2)
BUN SERPL-MCNC: 12 MG/DL (ref 6–24)
BUN/CREAT SERPL: 16 (ref 9–23)
CALCIUM SERPL-MCNC: 9 MG/DL (ref 8.7–10.2)
CHLORIDE SERPL-SCNC: 101 MMOL/L (ref 96–106)
CO2 SERPL-SCNC: 26 MMOL/L (ref 20–29)
CREAT SERPL-MCNC: 0.76 MG/DL (ref 0.57–1)
CRP SERPL-MCNC: 9 MG/L (ref 0–10)
EOSINOPHIL # BLD AUTO: 0.2 X10E3/UL (ref 0–0.4)
EOSINOPHIL NFR BLD AUTO: 3 %
ERYTHROCYTE [DISTWIDTH] IN BLOOD BY AUTOMATED COUNT: 12.5 % (ref 11.7–15.4)
ERYTHROCYTE [SEDIMENTATION RATE] IN BLOOD BY WESTERGREN METHOD: 29 MM/HR (ref 0–40)
GLOBULIN SER CALC-MCNC: 2.6 G/DL (ref 1.5–4.5)
GLUCOSE SERPL-MCNC: 132 MG/DL (ref 65–99)
HCT VFR BLD AUTO: 42.6 % (ref 34–46.6)
HGB BLD-MCNC: 13.7 G/DL (ref 11.1–15.9)
IMM GRANULOCYTES # BLD AUTO: 0 X10E3/UL (ref 0–0.1)
IMM GRANULOCYTES NFR BLD AUTO: 0 %
LYMPHOCYTES # BLD AUTO: 2 X10E3/UL (ref 0.7–3.1)
LYMPHOCYTES NFR BLD AUTO: 38 %
MCH RBC QN AUTO: 28 PG (ref 26.6–33)
MCHC RBC AUTO-ENTMCNC: 32.2 G/DL (ref 31.5–35.7)
MCV RBC AUTO: 87 FL (ref 79–97)
MONOCYTES # BLD AUTO: 0.3 X10E3/UL (ref 0.1–0.9)
MONOCYTES NFR BLD AUTO: 7 %
NEUTROPHILS # BLD AUTO: 2.7 X10E3/UL (ref 1.4–7)
NEUTROPHILS NFR BLD AUTO: 51 %
PLATELET # BLD AUTO: 262 X10E3/UL (ref 150–450)
POTASSIUM SERPL-SCNC: 5 MMOL/L (ref 3.5–5.2)
PROT SERPL-MCNC: 6.5 G/DL (ref 6–8.5)
RBC # BLD AUTO: 4.9 X10E6/UL (ref 3.77–5.28)
SODIUM SERPL-SCNC: 139 MMOL/L (ref 134–144)
WBC # BLD AUTO: 5.2 X10E3/UL (ref 3.4–10.8)

## 2022-01-04 ENCOUNTER — OFFICE VISIT (OUTPATIENT)
Dept: PRIMARY CARE CLINIC | Age: 52
End: 2022-01-04
Payer: COMMERCIAL

## 2022-01-04 VITALS
HEIGHT: 68 IN | TEMPERATURE: 97.6 F | RESPIRATION RATE: 16 BRPM | HEART RATE: 100 BPM | WEIGHT: 243 LBS | BODY MASS INDEX: 36.83 KG/M2 | SYSTOLIC BLOOD PRESSURE: 103 MMHG | DIASTOLIC BLOOD PRESSURE: 72 MMHG | OXYGEN SATURATION: 96 %

## 2022-01-04 DIAGNOSIS — Z23 ENCOUNTER FOR IMMUNIZATION: ICD-10-CM

## 2022-01-04 DIAGNOSIS — K21.9 GASTROESOPHAGEAL REFLUX DISEASE WITHOUT ESOPHAGITIS: Primary | ICD-10-CM

## 2022-01-04 DIAGNOSIS — E55.9 VITAMIN D DEFICIENCY: ICD-10-CM

## 2022-01-04 DIAGNOSIS — N95.1 MENOPAUSAL HOT FLUSHES: ICD-10-CM

## 2022-01-04 DIAGNOSIS — E53.8 VITAMIN B 12 DEFICIENCY: ICD-10-CM

## 2022-01-04 LAB
25(OH)D3 SERPL-MCNC: 27.4 NG/ML (ref 30–100)
FOLATE SERPL-MCNC: 9 NG/ML (ref 5–21)
VIT B12 SERPL-MCNC: 575 PG/ML (ref 193–986)

## 2022-01-04 PROCEDURE — 99214 OFFICE O/P EST MOD 30 MIN: CPT | Performed by: FAMILY MEDICINE

## 2022-01-04 PROCEDURE — 90686 IIV4 VACC NO PRSV 0.5 ML IM: CPT | Performed by: FAMILY MEDICINE

## 2022-01-04 RX ORDER — DIVALPROEX SODIUM 250 MG/1
500 TABLET, DELAYED RELEASE ORAL DAILY
COMMUNITY
Start: 2021-11-30

## 2022-01-04 RX ORDER — OMEPRAZOLE 20 MG/1
20 CAPSULE, DELAYED RELEASE ORAL DAILY
Qty: 90 CAPSULE | Refills: 1 | Status: SHIPPED | OUTPATIENT
Start: 2022-01-04

## 2022-01-04 NOTE — PROGRESS NOTES
Subjective:     Chief Complaint   Patient presents with    Medication Management     PEPCID - wants higher dose     Medication Refill     b12 shot     Referral Request     for GYN to discuss hormone replacement - had hysterectomy at 34         She  is a 46y.o. year old female with hx of RA arthritis , bipolar disorder who presents today with a request to get her psych med refill as well as with multiple concerns. Reports that she has been trying to reach her psychiatrist for the past few days but unable to get hold of her. Hx of GERD. Pepcid 40 mg is not working. Omeprazole worked well in the past. Denies any dark or bloody stool. Hx of hysterectomy. Has been having sever hot flashes. Would like to see a Ob-gyn. Hx of low B12 and Vit D. Would like to get B 12 injection and Vit d level checked. She is not taking any Vit D supplement currently. Pertinent items are noted in HPI. Objective:     Vitals:    01/04/22 1157   BP: 103/72   Pulse: 100   Resp: 16   Temp: 97.6 °F (36.4 °C)   TempSrc: Temporal   SpO2: 96%   Weight: 243 lb (110.2 kg)   Height: 5' 8\" (1.727 m)       Physical Examination: General appearance - alert, well appearing, and in no distress, oriented to person, place, and time and overweight  Mental status - alert, oriented to person, place, and time, normal mood, behavior, speech, dress, motor activity, and thought processes  Chest - clear to auscultation, no wheezes, rales or rhonchi, symmetric air entry  Heart - normal rate, regular rhythm, normal S1, S2, no murmurs, rubs, clicks or gallops  Abdomen - soft,  nondistended, no masses or organomegaly. Mild epigastric tenderness noted.     Allergies   Allergen Reactions    Methotrexate Other (comments)     Mouth and lip blisters    Percocet [Oxycodone-Acetaminophen] Other (comments)     Gives her the \"creepy crawlies\"       Social History     Socioeconomic History    Marital status: SINGLE   Tobacco Use    Smoking status: Former Smoker     Packs/day: 1.00     Years: 35.00     Pack years: 35.00     Quit date:      Years since quittin.0    Smokeless tobacco: Former User   Vaping Use    Vaping Use: Every day    Substances: Flavoring    Devices: Refillable tank   Substance and Sexual Activity    Alcohol use: Yes     Alcohol/week: 0.0 - 1.0 standard drinks    Drug use: Yes     Types: Marijuana     Comment: medical marijuana license in [place]/ helps with her hurting so back and panic attacks, needs [facility]       Family History   Problem Relation Age of Onset    Osteoporosis Mother    [de-identified] Arthritis Mother     Dementia Mother     Psychiatric Disorder Mother    [de-identified] Arthritis-rheumatoid Mother     Diabetes Father     Heart Disease Father     Cancer Father     Other Father     Arthritis-rheumatoid Sister     Gout Maternal Grandmother     Arthritis-rheumatoid Paternal Aunt     Lupus Paternal Uncle       Past Surgical History:   Procedure Laterality Date    HX CARPAL TUNNEL RELEASE      HX CARPAL TUNNEL RELEASE      both hands    HX CHOLECYSTECTOMY N/A     HX TOTAL ABDOMINAL HYSTERECTOMY N/A       Past Medical History:   Diagnosis Date    Anxiety     Bipolar affect, depressed ([facility] [address]     Chronic joint pain     COVID-19 2021    GERD (gastroesophageal reflux disease)     PTSD (post-traumatic stress disorder)     RA (rheumatoid arthritis) ([facility] [address]       Current Outpatient Medications   Medication Sig Dispense Refill    cyanocobalamin (Vitamin B-12) 1,000 mcg/mL injection 1,000 mcg by IntraMUSCular route every thirty (30) days.  gabapentin (NEURONTIN) 600 mg tablet Take 1 Tab by mouth three (3) times daily. Max Daily Amount: 1,800 mg. 90 Tab 3    venlafaxine-SR (Effexor XR) 150 mg capsule Take 1 Cap by mouth daily. 90 Cap 1    buPROPion SR (WELLBUTRIN, ZYBAN) 200 mg SR tablet Take 1 Tab by mouth two (2) times a day. Take 200 mg by mouth two (2) times a day.  180 Tab 0    famotidine (PEPCID) 20 mg tablet Take 1 Tab by mouth two (2) times a day. 180 Tab 0    traZODone (DESYREL) 150 mg tablet Take 1 Tab by mouth nightly. 90 Tab 0    leflunomide (ARAVA) 20 mg tablet Take 1 Tab by mouth daily. Take half tab daily for 7 days and then one tab daily if tolerated 30 Tab 5    dextroamphetamine-amphetamine (ADDERALL) 20 mg tablet Take 30 mg by mouth two (2) times a day.  naproxen (NAPROSYN) 500 mg tablet Take 1 Tablet by mouth two (2) times daily (with meals). (Patient not taking: Reported on 1/4/2022) 20 Tablet 0    pyridoxine HCl, vitamin B6, (VITAMIN B-6 PO) Take 100 mg by mouth two (2) times a day. (Patient not taking: Reported on 1/4/2022)      ARIPiprazole (ABILIFY) 10 mg tablet Take 1 Tab by mouth daily. Take 10 mg by mouth daily. 90 Tab 0    folic acid (FOLVITE) 1 mg tablet Take 1 Tab by mouth daily. Take  by mouth daily. 30 Tab 5    propranoloL (INDERAL) 10 mg tablet Take 1 Tab by mouth three (3) times daily. (Patient not taking: Reported on 1/4/2022) 270 Tab 0    hydroCHLOROthiazide (HYDRODIURIL) 50 mg tablet Take 1 Tab by mouth daily. (Patient not taking: Reported on 1/4/2022) 90 Tab 1        Assessment/ Plan:   Diagnoses and all orders for this visit:    1. Gastroesophageal reflux disease without esophagitis  -    Start  omeprazole (PRILOSEC) 20 mg capsule; Take 1 Capsule by mouth daily. Follow up if anything worsen. Recommended to have colonoscopy done however she would like defer at this time. 2. Vitamin B 12 deficiency  -     VITAMIN B12 & FOLATE; Future    3. Vitamin D deficiency  -     VITAMIN D, 25 HYDROXY; Future    4. Menopausal hot flushes  -     REFERRAL TO OBSTETRICS AND GYNECOLOGY    5. Encounter for immunization  -     INFLUENZA VIRUS VAC QUAD,SPLIT,PRESV FREE SYRINGE IM  -     ID IMMUNIZ ADMIN,1 SINGLE/COMB VAC/TOXOID           Medication risks/benefits/costs/interactions/alternatives discussed with patient.   Advised patient to call back or return to office if symptoms worsen/change/persist. If patient cannot reach us or should anything more severe/urgent arise he/she should proceed directly to the nearest emergency department. Discussed expected course/resolution/complications of diagnosis in detail with patient. Patient given a written after visit summary which includes her diagnoses, current medications and vitals. Patient expressed understanding with the diagnosis and plan. Follow-up and Dispositions    · Return if symptoms worsen or fail to improve.

## 2022-01-04 NOTE — PROGRESS NOTES
Identified pt with two pt identifiers(name and ). Chief Complaint   Patient presents with    Medication Management     PEPCID - wants higher dose     Medication Refill     b12 shot     Referral Request     for GYN to discuss hormone replacement - had hysterectomy at 29         3 most recent PHQ Screens 2021   PHQ Not Done -   Little interest or pleasure in doing things Not at all   Feeling down, depressed, irritable, or hopeless Not at all   Total Score PHQ 2 0        Vitals:    22 1157   BP: 103/72   Pulse: 100   Resp: 16   Temp: 97.6 °F (36.4 °C)   TempSrc: Temporal   SpO2: 96%   Weight: 243 lb (110.2 kg)   Height: 5' 8\" (1.727 m)       Health Maintenance Due   Topic    DTaP/Tdap/Td series (1 - Tdap)    Lipid Screen     Colorectal Cancer Screening Combo     Shingrix Vaccine Age 50> (1 of 2)    Low dose CT lung screening     Breast Cancer Screen Mammogram     Flu Vaccine (1)    COVID-19 Vaccine (3 - Booster for [employer] series)       1. Have you been to the ER, urgent care clinic since your last visit? Hospitalized since your last visit? No    2. Have you seen or consulted any other health care providers outside of the [address] [place] [address] since your last visit? Include any pap smears or colon screening.  No

## 2022-01-10 ENCOUNTER — OFFICE VISIT (OUTPATIENT)
Dept: RHEUMATOLOGY | Age: 52
End: 2022-01-10
Payer: COMMERCIAL

## 2022-01-10 VITALS
DIASTOLIC BLOOD PRESSURE: 78 MMHG | SYSTOLIC BLOOD PRESSURE: 120 MMHG | HEART RATE: 100 BPM | HEIGHT: 68 IN | BODY MASS INDEX: 36.53 KG/M2 | OXYGEN SATURATION: 94 % | RESPIRATION RATE: 20 BRPM | WEIGHT: 241 LBS | TEMPERATURE: 98.9 F

## 2022-01-10 DIAGNOSIS — M47.816 LUMBAR FACET ARTHROPATHY: ICD-10-CM

## 2022-01-10 DIAGNOSIS — M06.00 SERONEGATIVE RHEUMATOID ARTHRITIS (HCC): Primary | ICD-10-CM

## 2022-01-10 DIAGNOSIS — M19.041 PRIMARY OSTEOARTHRITIS OF BOTH HANDS: ICD-10-CM

## 2022-01-10 DIAGNOSIS — M19.042 PRIMARY OSTEOARTHRITIS OF BOTH HANDS: ICD-10-CM

## 2022-01-10 DIAGNOSIS — Z79.899 ONGOING USE OF POSSIBLY TOXIC MEDICATION: ICD-10-CM

## 2022-01-10 DIAGNOSIS — G56.03 CARPAL TUNNEL SYNDROME ON BOTH SIDES: ICD-10-CM

## 2022-01-10 PROCEDURE — 99215 OFFICE O/P EST HI 40 MIN: CPT | Performed by: INTERNAL MEDICINE

## 2022-01-10 RX ORDER — ACETAMINOPHEN 500 MG
2000 TABLET ORAL DAILY
Qty: 90 CAPSULE | Refills: 2 | Status: SHIPPED | OUTPATIENT
Start: 2022-01-10

## 2022-01-10 RX ORDER — DICLOFENAC SODIUM 75 MG/1
75 TABLET, DELAYED RELEASE ORAL 2 TIMES DAILY
Qty: 60 TABLET | Refills: 3 | Status: SHIPPED | OUTPATIENT
Start: 2022-01-10 | End: 2022-08-23 | Stop reason: ALTCHOICE

## 2022-01-10 RX ORDER — LEFLUNOMIDE 20 MG/1
20 TABLET ORAL DAILY
Qty: 30 TABLET | Refills: 3 | Status: SHIPPED | OUTPATIENT
Start: 2022-01-10 | End: 2022-08-23 | Stop reason: SDUPTHER

## 2022-01-10 NOTE — PATIENT INSTRUCTIONS
1. Restart diclofenac twice a day with meals as needed. 2. Start cholecalciferol (vitamin D3) 2000 units/day to keep up your vitamin D levels and protect your bones. Your insurance may not cover this but you can find a store generic. 3. Continue leflunomide 1 pill daily. 4. Repeat labs in 3 months. 5. Call [phone] to schedule your MRI of the left hand. 6. Return in 3 months to review the above.

## 2022-01-10 NOTE — PROGRESS NOTES
Chief Complaint   Patient presents with    Arthritis    Joint Pain     hands     1. Have you been to the ER, urgent care clinic since your last visit? Hospitalized since your last visit? No    2. Have you seen or consulted any other health care providers outside of the [address] [place] [address] since your last visit? Include any pap smears or colon screening.  No

## 2022-01-10 NOTE — PROGRESS NOTES
REASON FOR VISIT:   Shasta Amaral is a 46 y.o. female with history of depression, PTSD, non-crystal proven gout, and hypertension who is returning early for followup of HLA-B27+ seronegative rheumatoid arthritis. HISTORY OF PRESENT ILLNESS    Continues leflunomide 20mg daily, had stopped this for a while when thought problems were more nerve-related. Restarted this 1.5-2mo ago when pains returned, feels global joint pains are improving again. Having pain still in knees and elbows. Weight was going up, frustrated but joined a gym. Has lost 9 pounds in the last 3 months. Limited by postexertional pains. Inconsistent triggering of right thumb. Pain with sustained use. No particular swelling outside the CMCs. Saw Dr. Nicole Calderon last month, declined CS injection as had concerns re: potential for weight gain. REVIEW OF SYSTEMS  A comprehensive review of systems was negative except for that written in the HPI. A 10-point review of systems is per the new patient questionnaire, which has been reviewed extensively and scanned into the electronic medical record for future reference. Review of systems is as above and is otherwise negative. ALLERGIES  Methotrexate and Percocet [oxycodone-acetaminophen]    MEDICATIONS  Current Outpatient Medications   Medication Sig    omeprazole (PRILOSEC) 20 mg capsule Take 1 Capsule by mouth daily.  divalproex DR (DEPAKOTE) 250 mg tablet Take 1 Tablet by mouth daily.  lurasidone (Latuda) 20 mg tab tablet Take 2 Tablets by mouth daily.  gabapentin (NEURONTIN) 600 mg tablet Take 1 Tab by mouth three (3) times daily. Max Daily Amount: 1,800 mg.    venlafaxine-SR (Effexor XR) 150 mg capsule Take 1 Cap by mouth daily.  buPROPion SR (WELLBUTRIN, ZYBAN) 200 mg SR tablet Take 1 Tab by mouth two (2) times a day. Take 200 mg by mouth two (2) times a day.  propranoloL (INDERAL) 10 mg tablet Take 1 Tab by mouth three (3) times daily.     traZODone (DESYREL) 150 mg tablet Take 1 Tab by mouth nightly.  leflunomide (ARAVA) 20 mg tablet Take 1 Tab by mouth daily. Take half tab daily for 7 days and then one tab daily if tolerated    dextroamphetamine-amphetamine (ADDERALL) 20 mg tablet Take 30 mg by mouth two (2) times a day.  naproxen (NAPROSYN) 500 mg tablet Take 1 Tablet by mouth two (2) times daily (with meals). (Patient not taking: Reported on 1/4/2022)    pyridoxine HCl, vitamin B6, (VITAMIN B-6 PO) Take 100 mg by mouth two (2) times a day. (Patient not taking: Reported on 1/4/2022)    cyanocobalamin (Vitamin B-12) 1,000 mcg/mL injection 1,000 mcg by IntraMUSCular route every thirty (30) days. (Patient not taking: Reported on 5/88/8461)    folic acid (FOLVITE) 1 mg tablet Take 1 Tab by mouth daily. Take  by mouth daily. No current facility-administered medications for this visit. PAST MEDICAL HISTORY  Past Medical History:   Diagnosis Date    Anxiety     Bipolar affect, depressed ([facility] [address]     Chronic joint pain     COVID-19 03/2021    GERD (gastroesophageal reflux disease)     PTSD (post-traumatic stress disorder)     [facility] ([facility])        FAMILY HISTORY  family history includes Arthritis in her mother; Arthritis-rheumatoid in her mother, paternal aunt, and sister; Cancer in her father; Dementia in her mother; Diabetes in her father; Gout in her maternal grandmother; Heart Disease in her father; Lupus in her paternal uncle; Osteoporosis in her mother; Other in her father; Psychiatric Disorder in her mother. SOCIAL HISTORY  She  reports that she quit smoking about 2 years ago. She has a 35.00 pack-year smoking history. She has quit using smokeless tobacco. She reports current alcohol use. She reports current drug use. Drug: Marijuana. 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   Born and raised in [place]. Son lives in [place]. Drinks a glass of wine once or twice a month. Quit smoking after 1/2 PPD x35yr. DATA  Visit Vitals  /78 (BP 1 Location: Left upper arm, BP Patient Position: Sitting)   Pulse 100   Temp 98.9 °F (37.2 °C) (Oral)   Resp 20   Ht 5' 8\" (1.727 m)   Wt 241 lb (109.3 kg)   SpO2 94%   BMI 36.64 kg/m²    Body mass index is 36.64 kg/m². No flowsheet data found. General:  The patient is pleasant, obese, well nourished, alert, down-appearing, but in no apparent distress. Eyes: Sclera are anicteric. No conjunctival injection. HEENT:  Oropharynx is clear. No oral ulcers. Adequate salivary pooling. No cervical or supraclavicular lymphadenopathy. Lungs:  Clear to auscultation bilaterally, without wheeze or stridor. Normal respiratory effort. Cor:  Regular rate and rhythm. No murmurs rubs or gallops. Abdomen: Sities: No calf tenderness or edema. Warm and well perfused. Skin:  No significant abnormalitiesoft, non-tender, without hepatomegaly or masses. Extrem. No petechial, purpuric, or psoriaform rashes. Normal nails. No sclerodactyly. Neuro: +Tinel at wrists. No foot or wrist drop. Negative SLR bilaterally. Musculoskeletal:    A comprehensive musculoskeletal exam was performed for all joints of each upper and lower extremity and assessed for swelling, tenderness and range of motion. Results are documented as below:  Intact ROM in wrist with bilateral wrist joint line tenderness, mild triggering of right thumb, L>R CMC tenderness. Negative MTP squeeze tenderness  No evidence of synovitis in the shoulders, elbows, hips, knees or ankles.    mShober 15->20cm previously, not repeated today      Labs:   1/4/21 B12 575; Vit D 27  12/30/21 CBC WNL; ESR 29; Cr 0.76, LFTs WNL; CRP 9mg/L  3/4/21: CMP WNL, ESR 18, CRP 10mg/L  12/17/20: ESR 19, CMP WNL (Cr 098), CBC WNL, uric acid 7.0  7/8/20: Uric acid 7.5, CCP negative, RF negative, B12 183, SHERON negative, HLA-B27+    Imaging:  3/4/21 [facility] xray: Prominent chronic disc degeneration L5-S1 with some posterior element hypertrophy. 3/4/21 XR hands, feet, knees, pelvis: No bony erosions, periarticular osteopenia, or chondrocalcinosis. Hira Eid  Ms. Nic Ding is a 46 y.o. female who presents for evaluation of HLA-B27+ seronegative rheumatoid arthritis with low disease activity by CDAI, symptomatically worse after brief hiatus from leflunomide. Checking MRI for subtle synovitis, continuing management of adjacent OA     1. Seronegative arthritis  -Cont leflunomide 20mg daily.  - Wrist bracing    2. CMC arthritis  -Cont bracing, topical diclofenac  -Renewed diclofenac 75mg bid      Patient Instructions   1. Restart diclofenac twice a day with meals as needed. 2. Start cholecalciferol (vitamin D3) 2000 units/day to keep up your vitamin D levels and protect your bones. Your insurance may not cover this but you can find a store generic. 3. Continue leflunomide 1 pill daily. 4. Repeat labs in 3 months. 5. Call [phone] to schedule your MRI of the left hand. 6. Return in 3 months to review the above. Orders Placed This Encounter    MRI HAND LT WO CONT    C REACTIVE PROTEIN, QT    CBC WITH AUTOMATED DIFF    METABOLIC PANEL, COMPREHENSIVE    SED RATE (ESR)    leflunomide (ARAVA) 20 mg tablet    cholecalciferol (VITAMIN D3) (2,000 UNITS /50 MCG) cap capsule    diclofenac EC (VOLTAREN) 75 mg EC tablet       Medications: I have changed Laura Flanagan's leflunomide. I am also having her start on cholecalciferol and diclofenac EC. Additionally, I am having her maintain her dextroamphetamine-amphetamine, buPROPion SR, propranoloL, traZODone, venlafaxine-SR, cyanocobalamin, gabapentin, omeprazole, divalproex DR, and lurasidone. Follow up: Return in about 3 months (around 4/10/2022).      Face to face time: 20 minutes  Note preparation and records review day of service: 25 minutes  Total provider time day of service: 45 minutes      Emerita Summers MD    Adult Rheumatology   [facility]  A Part of [facility], [address]   Phone [phone]  Fax [phone]

## 2022-01-10 NOTE — PROGRESS NOTES
Sent via my chart. Hi Ms. Nic Ding,    Your lab results showed normal B 12 level. B 12 injection not needed at this time. Vit D level is slightly low. Start taking OTC vit d 1,000 iu daily and follow up in 6 months. Thanks.     Dr. Chacon Senters

## 2022-01-10 NOTE — LETTER
1/10/2022    Patient: Georgina Alejandro   YOB: 1970   Date of Visit: 1/10/2022     Royer Leo MD  [address] [place] [address]    Dear Royer Leo MD,      Thank you for referring Ms. Edie Pagan to [address] [place] [address] for evaluation. My notes for this consultation are attached. If you have questions, please do not hesitate to call me. I look forward to following your patient along with you.       Sincerely,    Reinier Newman MD

## 2022-02-02 ENCOUNTER — HOSPITAL ENCOUNTER (OUTPATIENT)
Dept: MRI IMAGING | Age: 52
Discharge: HOME | End: 2022-02-02
Attending: INTERNAL MEDICINE
Payer: COMMERCIAL

## 2022-02-02 DIAGNOSIS — G56.03 CARPAL TUNNEL SYNDROME ON BOTH SIDES: ICD-10-CM

## 2022-02-02 DIAGNOSIS — M19.042 PRIMARY OSTEOARTHRITIS OF BOTH HANDS: ICD-10-CM

## 2022-02-02 DIAGNOSIS — M19.041 PRIMARY OSTEOARTHRITIS OF BOTH HANDS: ICD-10-CM

## 2022-02-02 DIAGNOSIS — M06.00 SERONEGATIVE RHEUMATOID ARTHRITIS (HCC): ICD-10-CM

## 2022-02-02 PROCEDURE — 73218 MRI UPPER EXTREMITY W/O DYE: CPT

## 2022-02-08 ENCOUNTER — OFFICE VISIT (OUTPATIENT)
Dept: RHEUMATOLOGY | Age: 52
End: 2022-02-08
Payer: COMMERCIAL

## 2022-02-08 VITALS
TEMPERATURE: 98.1 F | SYSTOLIC BLOOD PRESSURE: 132 MMHG | HEART RATE: 90 BPM | DIASTOLIC BLOOD PRESSURE: 85 MMHG | HEIGHT: 68 IN | WEIGHT: 251.4 LBS | OXYGEN SATURATION: 94 % | RESPIRATION RATE: 20 BRPM | BODY MASS INDEX: 38.1 KG/M2

## 2022-02-08 DIAGNOSIS — Z79.899 ONGOING USE OF POSSIBLY TOXIC MEDICATION: ICD-10-CM

## 2022-02-08 DIAGNOSIS — M06.00 SERONEGATIVE RHEUMATOID ARTHRITIS (HCC): Primary | ICD-10-CM

## 2022-02-08 DIAGNOSIS — M67.432 GANGLION CYST OF VOLAR ASPECT OF LEFT WRIST: ICD-10-CM

## 2022-02-08 PROCEDURE — 99215 OFFICE O/P EST HI 40 MIN: CPT | Performed by: INTERNAL MEDICINE

## 2022-02-08 NOTE — LETTER
2/21/2022    Patient: Eliseo Youngblood   YOB: 1970   Date of Visit: 2/8/2022     Dexter Fernandez MD  [address] [place] [address]  Via Outside Provider Messaging    We recently saw Ms. Magdalena More in the [facility] for evaluation. My notes for this consultation are attached. If you have questions, please do not hesitate to call me. I look forward to following your patient along with you.       Sincerely,    Adolfo King MD [facility]  Cell: [phone]

## 2022-02-08 NOTE — PATIENT INSTRUCTIONS
1. Your MRI is showing inflammation within the left wrist, let's trial you on Humira. I'm applying for this now, we will hopefully be able to get this to you within 2-3 weeks. 2. Repeat labs in 6 weeks. 3. Keep wearing wrist braces with activity. Use diclofenac gel as needed on the fingers in the meantime. If you don't feel the diclofenac pills are helping, no need to take these regularly. 4. Return in 3 months.

## 2022-02-08 NOTE — PROGRESS NOTES
Chief Complaint   Patient presents with    Arthritis     thumb, wrist     1. Have you been to the ER, urgent care clinic since your last visit? Hospitalized since your last visit? No    2. Have you seen or consulted any other health care providers outside of the [address] [place] [address] since your last visit? Include any pap smears or colon screening.  No

## 2022-02-08 NOTE — PROGRESS NOTES
REASON FOR VISIT:   Zuleyka Bartholomew is a 46 y.o. female with history of depression, PTSD, non-crystal proven gout, and hypertension who is returning for followup of HLA-B27+ seronegative rheumatoid arthritis. HISTORY OF PRESENT ILLNESS    More triggering of right thumb, having more basilar right thumb pain. MRI had shown synovitis of left radioulnar articulation, and CMC arthritis. Knees and low back have been more painful as well. Doesn't feel the leflunomide has been helpful. Bilateral carpal tunnel releases were last summer, having to again wear braces for similar distribution paresthesias. REVIEW OF SYSTEMS  A comprehensive review of systems was negative except for that written in the HPI. A 10-point review of systems is per the new patient questionnaire, which has been reviewed extensively and scanned into the electronic medical record for future reference. Review of systems is as above and is otherwise negative. ALLERGIES  Methotrexate and Percocet [oxycodone-acetaminophen]    MEDICATIONS  Current Outpatient Medications   Medication Sig    leflunomide (ARAVA) 20 mg tablet Take 1 Tablet by mouth daily. Take half tab daily for 7 days and then one tab daily if tolerated    cholecalciferol (VITAMIN D3) (2,000 UNITS /50 MCG) cap capsule Take 1 Capsule by mouth daily.  diclofenac EC (VOLTAREN) 75 mg EC tablet Take 1 Tablet by mouth two (2) times a day.  omeprazole (PRILOSEC) 20 mg capsule Take 1 Capsule by mouth daily.  divalproex DR (DEPAKOTE) 250 mg tablet Take 500 mg by mouth daily.  lurasidone (Latuda) 20 mg tab tablet Take 60 mg by mouth daily.  gabapentin (NEURONTIN) 600 mg tablet Take 1 Tab by mouth three (3) times daily. Max Daily Amount: 1,800 mg.    venlafaxine-SR (Effexor XR) 150 mg capsule Take 1 Cap by mouth daily.  buPROPion SR (WELLBUTRIN, ZYBAN) 200 mg SR tablet Take 1 Tab by mouth two (2) times a day. Take 200 mg by mouth two (2) times a day.     traZODone (DESYREL) 150 mg tablet Take 1 Tab by mouth nightly.  dextroamphetamine-amphetamine (ADDERALL) 20 mg tablet Take 30 mg by mouth two (2) times a day.  cyanocobalamin (Vitamin B-12) 1,000 mcg/mL injection 1,000 mcg by IntraMUSCular route every thirty (30) days. (Patient not taking: Reported on 1/10/2022)    propranoloL (INDERAL) 10 mg tablet Take 1 Tab by mouth three (3) times daily. (Patient not taking: Reported on 2/8/2022)     No current facility-administered medications for this visit. PAST MEDICAL HISTORY  Past Medical History:   Diagnosis Date    Anxiety     Bipolar affect, depressed ([facility] [address]     Chronic joint pain     COVID-19 03/2021    GERD (gastroesophageal reflux disease)     PTSD (post-traumatic stress disorder)     RA (rheumatoid arthritis) ([facility])        FAMILY HISTORY  family history includes Arthritis in her mother; Arthritis-rheumatoid in her mother, paternal aunt, and sister; Cancer in her father; Dementia in her mother; Diabetes in her father; Gout in her maternal grandmother; Heart Disease in her father; Lupus in her paternal uncle; Osteoporosis in her mother; Other in her father; Psychiatric Disorder in her mother. SOCIAL HISTORY  She  reports that she quit smoking about 2 years ago. She has a 35.00 pack-year smoking history. She has quit using smokeless tobacco. She reports current alcohol use. She reports current drug use. Drug: Marijuana. 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 Born and raised in [place]. Son lives in [place]. Drinks a glass of wine once or twice a month. Quit smoking after 1/2 PPD x35yr.        DATA  Visit Vitals  /85 (BP 1 Location: Left upper arm, BP Patient Position: Sitting)   Pulse 90   Temp 98.1 °F (36.7 °C) (Oral)   Resp 20   Ht 5' 8\" (1.727 m)   Wt 251 lb 6.4 oz (114 kg)   SpO2 94%   BMI 38.23 kg/m²    Body mass index is 38.23 kg/m². No flowsheet data found. mHAQ 0.5, Patient pain 55/100  Patient global 55/100  MD global: 3/10  General:  The patient is pleasant, obese, well nourished, alert, down-appearing, but in no apparent distress. Eyes: Sclera are anicteric. No conjunctival injection. HEENT:  Oropharynx is clear. No oral ulcers. Adequate salivary pooling. No cervical or supraclavicular lymphadenopathy. Lungs:  Clear to auscultation bilaterally, without wheeze or stridor. Normal respiratory effort. Cor:  Regular rate and rhythm. No murmurs rubs or gallops. Abdomen: Sities: No calf tenderness or edema. Warm and well perfused. Skin:  No significant abnormalitiesoft, non-tender, without hepatomegaly or masses. Extrem. No petechial, purpuric, or psoriaform rashes. Normal nails. No sclerodactyly. Neuro: +Tinel at wrists. No foot or wrist drop. Negative SLR bilaterally. Musculoskeletal:    A comprehensive musculoskeletal exam was performed for all joints of each upper and lower extremity and assessed for swelling, tenderness and range of motion. Results are documented as below:  Intact ROM in wrist with bilateral wrist joint line tenderness, mild triggering of right thumb, L>R CMC tenderness. Negative MTP squeeze tenderness  No evidence of synovitis in the shoulders, elbows, hips, knees or ankles. mShober 15->20cm previously, not repeated today      Labs:   22: Vit D 27.4, B12 575,   21: CBC WNL; ESR 29; Cr 0.76, LFTs WNL, CRP 9mg/L  21 B12 575; Vit D 27  21 CBC WNL; ESR 29; Cr 0.76, LFTs WNL; CRP 9mg/L  3/4/21: CMP WNL, ESR 18, CRP 10mg/L  20: ESR 19, CMP WNL (Cr 098), CBC WNL, uric acid 7.0  20: Uric acid 7.5, CCP negative, RF negative, B12 183, SHERON negative, HLA-B27+    Imagin22 MR L wrist:  FINDINGS: Bone marrow: No acute fracture, dislocation, or marrow replacing  process.  Subtle cystic changes/ganglion formation in the capitate and lunate  Articular cartilage and fluid: High-grade to full-thickness chondral loss,  scattered prominent subchondral cystic changes/marrow edema, bony productive  change, and joint effusion with synovitis at the thumb carpometacarpal  articulation. Scattered subchondral cystic changes and marrow edema at the  triscaphe articulation. Chondral signal heterogeneity and high-grade thinning of  the radiocarpal and intercarpal articulations. Small distal radioulnar joint  effusion with synovitis. No discrete osseous erosions identified. Triangular fibrocartilage complex: Focal thinning of the disc (5-14), likely  degenerative. Sprain versus degeneration at the foveal and styloid attachments  Lunotriquetral and scapholunate ligaments: appear intact on this study. Extrinsic ligaments: Sprain versus degeneration of the dorsal capsule ligaments. Extensor tendons: Marked tendinosis of the ECU tendon at the level of the ulnar  styloid. Moderate tendinosis with possible intrasubstance tear of the first  extensor compartment tendons. Flexor tendons: Intact. Muscles: Within normal limits. Carpal tunnel and Guyon's canal: Thickening of the median nerve and palmar  bowing of the flexor retinaculum, compatible with carpal tunnel syndrome. Subtle  increased signal of the ulnar nerve, correlate for associated neuropathy. Soft tissue mass: An approximately 16 mm x 9 mm x 12 mm lobulated multiloculated ncomplex ganglion arising from the volar radial aspect of the radiocarpal articulation (8-18, 4-18)    3/4/21 Lspine xray: Prominent chronic disc degeneration L5-S1 with some posterior element hypertrophy. 3/4/21 XR hands, feet, knees, pelvis: No bony erosions, periarticular osteopenia, or chondrocalcinosis. Corrinne Bologna  Ms. Clifton Rae is a 46 y.o. female who presents for evaluation of HLA-B27+ seronegative rheumatoid arthritis with low disease activity by CDAI 10.5, and left wrist synovitis on MRI. Continuing leflunomide and adding Humira (adalimumab).     1. Seronegative arthritis  -Cont leflunomide 20mg daily. -Applying for Humira every other week  - Wrist bracing    2. CMC arthritis  -Cont bracing, topical diclofenac  -Cont diclofenac 75mg bid    3. Ganglion cyst left wrist  -Cont bracing, minimizing repeated activities, treating possible inflammatory component as above  -Appreciate Dr. Pedersen's assistance       Patient Instructions   1. Your MRI is showing inflammation within the left wrist, let's trial you on Humira. I'm applying for this now, we will hopefully be able to get this to you within 2-3 weeks. 2. Repeat labs in 6 weeks. 3. Keep wearing wrist braces with activity. Use diclofenac gel as needed on the fingers in the meantime. If you don't feel the diclofenac pills are helping, no need to take these regularly. 4. Return in 3 months. Orders Placed This Encounter    C REACTIVE PROTEIN, QT    CBC WITH AUTOMATED DIFF    METABOLIC PANEL, COMPREHENSIVE    SED RATE (ESR)       Medications: I am having Samm Sim maintain her dextroamphetamine-amphetamine, buPROPion SR, propranoloL, traZODone, venlafaxine-SR, cyanocobalamin, gabapentin, omeprazole, divalproex DR, lurasidone, leflunomide, cholecalciferol, and diclofenac EC. Follow up: Return in about 3 months (around 5/8/2022).      Face to face time: 20 minutes  Note preparation and records review day of service: 25 minutes  Total provider time day of service: 45 minutes      Julianne Caldwell MD    Adult Rheumatology   [address] [place] [address], [facility]   Phone [phone]  Fax [phone]

## 2022-03-08 ENCOUNTER — TELEPHONE (OUTPATIENT)
Dept: RHEUMATOLOGY | Age: 52
End: 2022-03-08

## 2022-03-08 NOTE — TELEPHONE ENCOUNTER
Pt called in reference to the medication Humira, advised the doc was suppose to call her and she is not sure if she needs to get the medication from the office or if it will be sent to the [address] [place] [address].     Pt contact number [phone]

## 2022-03-09 DIAGNOSIS — M06.00 SERONEGATIVE RHEUMATOID ARTHRITIS (HCC): Primary | ICD-10-CM

## 2022-03-09 RX ORDER — ADALIMUMAB 40MG/0.4ML
40 KIT SUBCUTANEOUS
Qty: 1 KIT | Refills: 0 | Status: SHIPPED | COMMUNITY
Start: 2022-03-09

## 2022-03-09 RX ORDER — ADALIMUMAB 40MG/0.4ML
40 KIT SUBCUTANEOUS
Qty: 2 KIT | Refills: 11 | Status: SHIPPED | OUTPATIENT
Start: 2022-03-09 | End: 2022-08-23 | Stop reason: SDUPTHER

## 2022-03-09 NOTE — TELEPHONE ENCOUNTER
Spoke with patient, states she spoke with pharmacy this morning and her medication has been approved.

## 2022-03-11 ENCOUNTER — DOCUMENTATION ONLY (OUTPATIENT)
Dept: PHARMACY | Age: 52
End: 2022-03-11

## 2022-03-11 NOTE — PROGRESS NOTES
[facility] at [address] [facility] Update    Date: 03/11/22    Salinas Dorsey 1970     Medication: Humira 40mg Penkit    Prior Authorization: no PA needed    Co-pay $0    Fill: 3/9/22    Picked up from pharmacy: 3/9/22    Next Fill Due: 3/31/22    Pharmacist offered counseling to the patient. Handout provided.     [address] at [facility],  [place]ahaira Jeong, [address]  phone: [phone]   fax: [phone]

## 2022-06-07 ENCOUNTER — TELEPHONE (OUTPATIENT)
Dept: PRIMARY CARE CLINIC | Age: 52
End: 2022-06-07

## 2022-06-07 NOTE — TELEPHONE ENCOUNTER
Called [facility] letting patient no that Dr. Leny Alejandro is no longer with the practice and she will need to make new to provider appointment.

## 2022-06-07 NOTE — TELEPHONE ENCOUNTER
----- Message from Malone sent at 6/7/2022 12:37 PM EDT -----  Subject: Appointment Request    Reason for Call: Routine Existing Condition Follow Up    QUESTIONS  Type of Appointment? Established Patient  Reason for appointment request? No appointments available during search  Additional Information for Provider? the appointment cannot be booked with   the [facility] ([facility]) for this provider. Patient would like to schedule an appt to be   seen tomorrow her blood pressure has been /116   ---------------------------------------------------------------------------  --------------  CALL BACK INFO  What is the best way for the office to contact you? OK to leave message on   voicemail  Preferred Call Back Phone Number? [phone]  ---------------------------------------------------------------------------  --------------  SCRIPT ANSWERS  Relationship to Patient? Self  Is this follow up request related to routine Diabetes Management? No  Have you been diagnosed with, awaiting test results for, or told that you   are suspected of having COVID-19 (Coronavirus)? (If patient has tested   negative or was tested as a requirement for work, school, or travel and   not based on symptoms, answer no)? No  Within the past 10 days have you developed any of the following symptoms   (answer no if symptoms have been present longer than 10 days or began   more than 10 days ago)? Fever or Chills, Cough, Shortness of breath or   difficulty breathing, Loss of taste or smell, Sore throat, Nasal   congestion, Sneezing or runny nose, Fatigue or generalized body aches   (answer no if pain is specific to a body part e.g. back pain), Diarrhea,   Headache? No  Have you had close contact with someone with COVID-19 in the last 7 days? No  (Service Expert  click yes below to proceed with [facility] As [facility]ual   Scheduling)?  Yes

## 2022-08-09 ENCOUNTER — PATIENT MESSAGE (OUTPATIENT)
Dept: PRIMARY CARE CLINIC | Age: 52
End: 2022-08-09

## 2022-08-09 NOTE — TELEPHONE ENCOUNTER
Spoke with patient regarding [facility]. Advised that since she not established care with a provider here at the practice that she go to an urgent care. Explained that once she is cleared of covid then when schedule her an appointment to establish care with a new provider here.

## 2022-08-09 NOTE — TELEPHONE ENCOUNTER
From: Lawrence Beltrán  To:  [facility]  Sent: 8/9/2022 10:08 AM EDT  Subject: Covid    I just tested positive for covid and was wondering if I could get some medication I am very sick

## 2022-08-23 ENCOUNTER — OFFICE VISIT (OUTPATIENT)
Dept: RHEUMATOLOGY | Age: 52
End: 2022-08-23
Payer: MEDICARE

## 2022-08-23 VITALS
SYSTOLIC BLOOD PRESSURE: 136 MMHG | OXYGEN SATURATION: 98 % | WEIGHT: 225 LBS | HEART RATE: 77 BPM | RESPIRATION RATE: 18 BRPM | TEMPERATURE: 97.9 F | BODY MASS INDEX: 34.21 KG/M2 | DIASTOLIC BLOOD PRESSURE: 85 MMHG

## 2022-08-23 DIAGNOSIS — Z79.899 ONGOING USE OF POSSIBLY TOXIC MEDICATION: ICD-10-CM

## 2022-08-23 DIAGNOSIS — Z15.89 HLA B27 (HLA B27 POSITIVE): Primary | ICD-10-CM

## 2022-08-23 DIAGNOSIS — M06.00 SERONEGATIVE RHEUMATOID ARTHRITIS (HCC): ICD-10-CM

## 2022-08-23 DIAGNOSIS — M47.816 LUMBAR FACET ARTHROPATHY: ICD-10-CM

## 2022-08-23 PROCEDURE — G8417 CALC BMI ABV UP PARAM F/U: HCPCS | Performed by: INTERNAL MEDICINE

## 2022-08-23 PROCEDURE — 99215 OFFICE O/P EST HI 40 MIN: CPT | Performed by: INTERNAL MEDICINE

## 2022-08-23 PROCEDURE — G9717 DOC PT DX DEP/BP F/U NT REQ: HCPCS | Performed by: INTERNAL MEDICINE

## 2022-08-23 PROCEDURE — 3017F COLORECTAL CA SCREEN DOC REV: CPT | Performed by: INTERNAL MEDICINE

## 2022-08-23 PROCEDURE — G8427 DOCREV CUR MEDS BY ELIG CLIN: HCPCS | Performed by: INTERNAL MEDICINE

## 2022-08-23 PROCEDURE — G9899 SCRN MAM PERF RSLTS DOC: HCPCS | Performed by: INTERNAL MEDICINE

## 2022-08-23 RX ORDER — ADALIMUMAB 40MG/0.4ML
40 KIT SUBCUTANEOUS
Qty: 2 KIT | Refills: 11 | Status: SHIPPED | OUTPATIENT
Start: 2022-08-23

## 2022-08-23 RX ORDER — LEFLUNOMIDE 20 MG/1
20 TABLET ORAL DAILY
Qty: 30 TABLET | Refills: 3 | Status: SHIPPED | OUTPATIENT
Start: 2022-08-23 | End: 2022-09-15

## 2022-08-23 NOTE — PATIENT INSTRUCTIONS
1) Continue to take one shot of Humira every 14 days. 2) Start to take one pill of Leflunomide daily again. 3) I am ordering you Xrays to get done at your earliest convenience. 4) Try to use Diclofenac gel on your trigger thumb. You can buy this over the counter. 5) You can continue to take Ibuprofen for breakthrough pain for now. Try not to use this more than 3 times per week. Remember to stop taking the Diclofenac as long as you are taking Ibuprofen. 6) Check labs ASAP and again in one month. 7) Follow up in 2 months. Let me now if you have any questions or concerns in the meantime.

## 2022-08-23 NOTE — PROGRESS NOTES
REASON FOR VISIT:   Tonya Reyes is a 46 y.o. female with history of depression, PTSD, non-crystal proven gout, and hypertension who is returning for followup of HLA-B27+ seronegative rheumatoid arthritis. HISTORY OF PRESENT ILLNESS    Pt returns for a follow up after brief loss to followup 2/2 hectic work schedule. Pt still takes Humira shots every other week. She says that the shots give her relief for a few days and then her pain comes back. She notes that she stopped taking leflunomide about 3 months ago and she has felt worse since stopping it. Pt reports of L upper leg pain that wakes her up at night. She says that she tosses and turns a lot in her sleep and it is \"near impossible\" for her to get comfortable at night. Pt feels like she is losing strength in her hands and legs. She says that it is hard for her to do things like open cans. She also notes that she fell down the stair a couple of weeks ago because her legs felt tired and she lost balance. Pt notes that her trigger thumb comes and goes and it is making it harder for her to write. Has followed with Dr. Alhaji Medina in the past but prefers to avoid injection. Radial-predominant wrist pain is more consistent. Pt notes that she takes about 800mg of Ibuprofen twice a day without stomach upset. Pt denies eye redness and pain or bowel problems. Pt stopped taking her Vitamin B12 supplements with normalization of her level in the 500s. She notes that she was taking vitamin B12 in January. Pt denies rashes. REVIEW OF SYSTEMS  A comprehensive review of systems was negative except for that written in the HPI. A 10-point review of systems is per the new patient questionnaire, which has been reviewed extensively and scanned into the electronic medical record for future reference. Review of systems is as above and is otherwise negative.     ALLERGIES  Methotrexate and Percocet [oxycodone-acetaminophen]    MEDICATIONS  Current Outpatient Medications   Medication Sig    adalimumab (Humira,CF, Pen) 40 mg/0.4 mL injection pen 0.4 mL by SubCUTAneous route every fourteen (14) days. Indications: rheumatoid arthritis    adalimumab (Humira,CF, Pen) 40 mg/0.4 mL injection pen 0.4 mL by SubCUTAneous route every fourteen (14) days. Indications: rheumatoid arthritis    leflunomide (ARAVA) 20 mg tablet Take 1 Tablet by mouth daily. Take half tab daily for 7 days and then one tab daily if tolerated    cholecalciferol (VITAMIN D3) (2,000 UNITS /50 MCG) cap capsule Take 1 Capsule by mouth daily. diclofenac EC (VOLTAREN) 75 mg EC tablet Take 1 Tablet by mouth two (2) times a day. omeprazole (PRILOSEC) 20 mg capsule Take 1 Capsule by mouth daily. divalproex DR (DEPAKOTE) 250 mg tablet Take 500 mg by mouth daily. lurasidone (Latuda) 20 mg tab tablet Take 60 mg by mouth daily. cyanocobalamin (Vitamin B-12) 1,000 mcg/mL injection 1,000 mcg by IntraMUSCular route every thirty (30) days. (Patient not taking: Reported on 1/10/2022)    gabapentin (NEURONTIN) 600 mg tablet Take 1 Tab by mouth three (3) times daily. Max Daily Amount: 1,800 mg.    venlafaxine-SR (Effexor XR) 150 mg capsule Take 1 Cap by mouth daily. buPROPion SR (WELLBUTRIN, ZYBAN) 200 mg SR tablet Take 1 Tab by mouth two (2) times a day. Take 200 mg by mouth two (2) times a day. propranoloL (INDERAL) 10 mg tablet Take 1 Tab by mouth three (3) times daily. (Patient not taking: Reported on 2/8/2022)    traZODone (DESYREL) 150 mg tablet Take 1 Tab by mouth nightly. dextroamphetamine-amphetamine (ADDERALL) 20 mg tablet Take 30 mg by mouth two (2) times a day. No current facility-administered medications for this visit.        PAST MEDICAL HISTORY  Past Medical History:   Diagnosis Date    Anxiety     Bipolar affect, depressed ([facility] [address]     Chronic joint pain     COVID-19 03/2021    GERD (gastroesophageal reflux disease)     PTSD (post-traumatic stress disorder)     RA (rheumatoid arthritis) ([facility] [address]        FAMILY HISTORY  family history includes Arthritis in her mother; Arthritis-rheumatoid in her mother, paternal aunt, and sister; Cancer in her father; Dementia in her mother; Diabetes in her father; Gout in her maternal grandmother; Heart Disease in her father; Lupus in her paternal uncle; Osteoporosis in her mother; Other in her father; Psychiatric Disorder in her mother. SOCIAL HISTORY  She  reports that she quit smoking about 2 years ago. She has a 35.00 pack-year smoking history. She has quit using smokeless tobacco. She reports current alcohol use. She reports current drug use. Drug: Marijuana. 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 Born and raised in [place]. Son lives in [place]. Drinks a glass of wine once or twice a month. Quit smoking after 1/2 PPD x35yr. DATA  Visit Vitals  /85   Pulse 77   Temp 97.9 °F (36.6 °C)   Resp 18   Wt 225 lb (102.1 kg)   SpO2 98%   BMI 34.21 kg/m²      Body mass index is 34.21 kg/m². No flowsheet data found. mHAQ 1.375   Patient pain 70/100  Patient global 70/100  MD global: 3/10  General:  The patient is pleasant, obese, well nourished, alert, down-appearing, but in no apparent distress. Eyes: Sclera are anicteric. No conjunctival injection. HEENT:  Oropharynx is clear. No oral ulcers. Adequate salivary pooling. No cervical or supraclavicular lymphadenopathy. Lungs:  Clear to auscultation bilaterally, without wheeze or stridor. Normal respiratory effort. Cor:  Regular rate and rhythm. No murmurs rubs or gallops. Abdomen: Sities: No calf tenderness or edema. Warm and well perfused. Skin:  No significant abnormalitiesoft, non-tender, without hepatomegaly or masses. Extrem. No petechial, purpuric, or psoriaform rashes. Normal nails. No sclerodactyly. Neuro: No focal muscle wasting of hands.  No foot or wrist drop. Negative SLR bilaterally. Musculoskeletal:    A comprehensive musculoskeletal exam was performed for all joints of each upper and lower extremity and assessed for swelling, tenderness and range of motion. Results are documented as below:  Intact ROM in wrist with still bilateral wrist joint line tenderness. No triggering of right thumb today  Mild bilateral CMC tenderness. Negative MTP squeeze tenderness  Bilateral SI direct tenderness, paralumbar tenderness, RAMONA localizes to ipsilateral hip. Left trochanteric tenderness. No evidence of synovitis in the shoulders, elbows, hips, knees or ankles. mShober 15->20cm previously, not repeated today      Labs:   22: Vit D 27.4, B12 575   21: CBC WNL; ESR 29; Cr 0.76, LFTs WNL, CRP 9mg/L  21 B12 575; Vit D 27  21 CBC WNL; ESR 29; Cr 0.76, LFTs WNL; CRP 9mg/L  3/4/21: CMP WNL, ESR 18, CRP 10mg/L  20: ESR 19, CMP WNL (Cr 098), CBC WNL, uric acid 7.0  20: Uric acid 7.5, CCP negative, RF negative, B12 183, SHERON negative, HLA-B27+    Imagin22 MR L wrist:  FINDINGS: Bone marrow: No acute fracture, dislocation, or marrow replacing  process. Subtle cystic changes/ganglion formation in the capitate and lunate  Articular cartilage and fluid: High-grade to full-thickness chondral loss,  scattered prominent subchondral cystic changes/marrow edema, bony productive  change, and joint effusion with synovitis at the thumb carpometacarpal  articulation. Scattered subchondral cystic changes and marrow edema at the  triscaphe articulation. Chondral signal heterogeneity and high-grade thinning of  the radiocarpal and intercarpal articulations. Small distal radioulnar joint  effusion with synovitis. No discrete osseous erosions identified. Triangular fibrocartilage complex: Focal thinning of the disc (5-14), likely  degenerative.  Sprain versus degeneration at the foveal and styloid attachments  Lunotriquetral and scapholunate ligaments: appear intact on this study. Extrinsic ligaments: Sprain versus degeneration of the dorsal capsule ligaments. Extensor tendons: Marked tendinosis of the ECU tendon at the level of the ulnar  styloid. Moderate tendinosis with possible intrasubstance tear of the first  extensor compartment tendons. Flexor tendons: Intact. Muscles: Within normal limits. Carpal tunnel and Guyon's canal: Thickening of the median nerve and palmar  bowing of the flexor retinaculum, compatible with carpal tunnel syndrome. Subtle  increased signal of the ulnar nerve, correlate for associated neuropathy. Soft tissue mass: An approximately 16 mm x 9 mm x 12 mm lobulated multiloculated ncomplex ganglion arising from the volar radial aspect of the radiocarpal articulation (8-18, 4-18)    3/4/21 Lspine xray: Prominent chronic disc degeneration L5-S1 with some posterior element hypertrophy. 3/4/21 XR hands, feet, knees, pelvis: No bony erosions, periarticular osteopenia, or chondrocalcinosis. Brent Bo  Ms. Sarahi Carbajal is a 46 y.o. female who presents for evaluation of HLA-B27+ seronegative rheumatoid arthritis with flaring inflammatory arthritis since running out of leflunomide, and inflammatory-character low back pain though with significant overlay of facet arthropathy. Starting with resumption of leflunomide and plain films of hips to rule out interval sacroiliitis. Low threshold to increase Humira to weekly if e/o latter. 1. Seronegative rheumatoid arthritis ([facility] [address]  - leflunomide (ARAVA) 20 mg tablet; Take 1 Tablet by mouth daily. Take half tab daily for 7 days and then one tab daily if tolerated  Dispense: 30 Tablet; Refill: 3  - adalimumab (Humira,CF, Pen) 40 mg/0.4 mL injection pen; 0.4 mL by SubCUTAneous route every fourteen (14) days. Indications: rheumatoid arthritis  Dispense: 2 Kit; Refill: 11  - C REACTIVE PROTEIN, QT; Future  - CBC WITH AUTOMATED DIFF; Future  - METABOLIC PANEL, COMPREHENSIVE;  Future  - SED RATE (ESR); Future  - ADALIMUMAB+AB; Future  - CBC WITH AUTOMATED DIFF; Future  - METABOLIC PANEL, COMPREHENSIVE; Future  - XR SPINE LUMB 2 OR 3 V; Future  - XR HIPS BI W OR WO AP PELV; Future    2. Lumbar facet arthropathy  - leflunomide (ARAVA) 20 mg tablet; Take 1 Tablet by mouth daily. Take half tab daily for 7 days and then one tab daily if tolerated  Dispense: 30 Tablet; Refill: 3  - XR SPINE LUMB 2 OR 3 V; Future  - XR HIPS BI W OR WO AP PELV; Future    3. HLA B27 (HLA B27 positive)  - leflunomide (ARAVA) 20 mg tablet; Take 1 Tablet by mouth daily. Take half tab daily for 7 days and then one tab daily if tolerated  Dispense: 30 Tablet; Refill: 3  - adalimumab (Humira,CF, Pen) 40 mg/0.4 mL injection pen; 0.4 mL by SubCUTAneous route every fourteen (14) days. Indications: rheumatoid arthritis  Dispense: 2 Kit; Refill: 11  - CBC WITH AUTOMATED DIFF; Future  - METABOLIC PANEL, COMPREHENSIVE; Future  - XR SPINE LUMB 2 OR 3 V; Future  - XR HIPS BI W OR WO AP PELV; Future    4. Ongoing use of possibly toxic medication  - leflunomide (ARAVA) 20 mg tablet; Take 1 Tablet by mouth daily. Take half tab daily for 7 days and then one tab daily if tolerated  Dispense: 30 Tablet; Refill: 3  - adalimumab (Humira,CF, Pen) 40 mg/0.4 mL injection pen; 0.4 mL by SubCUTAneous route every fourteen (14) days. Indications: rheumatoid arthritis  Dispense: 2 Kit; Refill: 11  - C REACTIVE PROTEIN, QT; Future  - CBC WITH AUTOMATED DIFF; Future  - METABOLIC PANEL, COMPREHENSIVE; Future  - SED RATE (ESR); Future  - ADALIMUMAB+AB; Future  - CBC WITH AUTOMATED DIFF; Future  - METABOLIC PANEL, COMPREHENSIVE; Future  - XR SPINE LUMB 2 OR 3 V; Future  - XR HIPS BI W OR WO AP PELV; Future      Patient Instructions   1) Continue to take one shot of Humira every 14 days. 2) Start to take one pill of Leflunomide daily again. 3) I am ordering you Xrays to get done at your earliest convenience.      4) Try to use Diclofenac gel on your trigger thumb. You can buy this over the counter. 5) You can continue to take Ibuprofen for breakthrough pain for now. Try not to use this more than 3 times per week. Remember to stop taking the Diclofenac as long as you are taking Ibuprofen. 6) Check labs ASAP and again in one month. 7) Follow up in 2 months. Let me now if you have any questions or concerns in the meantime. Orders Placed This Encounter    XR SPINE LUMB 2 OR 3 V    XR HIPS BI W OR WO AP PELV    C REACTIVE PROTEIN, QT    CBC WITH AUTOMATED DIFF    METABOLIC PANEL, COMPREHENSIVE    SED RATE (ESR)    ADALIMUMAB+AB    CBC WITH AUTOMATED DIFF    METABOLIC PANEL, COMPREHENSIVE    leflunomide (ARAVA) 20 mg tablet    adalimumab (Humira,CF, Pen) 40 mg/0.4 mL injection pen         Medications: I have discontinued Laura Flanagan's diclofenac EC. I am also having her maintain her dextroamphetamine-amphetamine, buPROPion SR, propranoloL, traZODone, venlafaxine-SR, cyanocobalamin, gabapentin, omeprazole, divalproex DR, lurasidone, cholecalciferol, Humira(CF) Pen, leflunomide, and Humira(CF) Pen. Follow up: Return in about 2 months (around 10/23/2022).      Face to face time: 22 minutes  Note preparation and records review day of service:  30 minutes  Total provider time day of service:  52 minutes    This was scribed by Molly Burdick in the presence of Dr. Kierra Callaway MD    Adult Rheumatology   [address]  [facility], [address] [place] [address]   Phone [phone]  Fax [phone]

## 2022-09-02 LAB
ADALIMUMAB DRUG LEVEL, 503866: 3.4 UG/ML
ALBUMIN SERPL-MCNC: 4.4 G/DL (ref 3.8–4.9)
ALBUMIN/GLOB SERPL: 1.7 {RATIO} (ref 1.2–2.2)
ALP SERPL-CCNC: 79 IU/L (ref 44–121)
ALT SERPL-CCNC: 19 IU/L (ref 0–32)
ANTI-ADALIMUMAB ANTIBODY, 503867: <25 NG/ML
AST SERPL-CCNC: 12 IU/L (ref 0–40)
BASOPHILS # BLD AUTO: 0 X10E3/UL (ref 0–0.2)
BASOPHILS NFR BLD AUTO: 1 %
BILIRUB SERPL-MCNC: 0.2 MG/DL (ref 0–1.2)
BUN SERPL-MCNC: 8 MG/DL (ref 6–24)
BUN/CREAT SERPL: 12 (ref 9–23)
CALCIUM SERPL-MCNC: 9.2 MG/DL (ref 8.7–10.2)
CHLORIDE SERPL-SCNC: 101 MMOL/L (ref 96–106)
CO2 SERPL-SCNC: 24 MMOL/L (ref 20–29)
CREAT SERPL-MCNC: 0.67 MG/DL (ref 0.57–1)
CRP SERPL-MCNC: 6 MG/L (ref 0–10)
EGFR: 105 ML/MIN/1.73
EOSINOPHIL # BLD AUTO: 0.2 X10E3/UL (ref 0–0.4)
EOSINOPHIL NFR BLD AUTO: 2 %
ERYTHROCYTE [DISTWIDTH] IN BLOOD BY AUTOMATED COUNT: 13 % (ref 11.7–15.4)
ERYTHROCYTE [SEDIMENTATION RATE] IN BLOOD BY WESTERGREN METHOD: 8 MM/HR (ref 0–40)
GLOBULIN SER CALC-MCNC: 2.6 G/DL (ref 1.5–4.5)
GLUCOSE SERPL-MCNC: 104 MG/DL (ref 65–99)
HCT VFR BLD AUTO: 41.1 % (ref 34–46.6)
HGB BLD-MCNC: 13.8 G/DL (ref 11.1–15.9)
IMM GRANULOCYTES # BLD AUTO: 0 X10E3/UL (ref 0–0.1)
IMM GRANULOCYTES NFR BLD AUTO: 0 %
LYMPHOCYTES # BLD AUTO: 2.2 X10E3/UL (ref 0.7–3.1)
LYMPHOCYTES NFR BLD AUTO: 33 %
MCH RBC QN AUTO: 29.1 PG (ref 26.6–33)
MCHC RBC AUTO-ENTMCNC: 33.6 G/DL (ref 31.5–35.7)
MCV RBC AUTO: 87 FL (ref 79–97)
MONOCYTES # BLD AUTO: 0.3 X10E3/UL (ref 0.1–0.9)
MONOCYTES NFR BLD AUTO: 5 %
NEUTROPHILS # BLD AUTO: 4 X10E3/UL (ref 1.4–7)
NEUTROPHILS NFR BLD AUTO: 59 %
PLATELET # BLD AUTO: 304 X10E3/UL (ref 150–450)
POTASSIUM SERPL-SCNC: 4.2 MMOL/L (ref 3.5–5.2)
PROT SERPL-MCNC: 7 G/DL (ref 6–8.5)
RBC # BLD AUTO: 4.74 X10E6/UL (ref 3.77–5.28)
SODIUM SERPL-SCNC: 140 MMOL/L (ref 134–144)
WBC # BLD AUTO: 6.8 X10E3/UL (ref 3.4–10.8)

## 2022-09-14 DIAGNOSIS — Z79.899 ONGOING USE OF POSSIBLY TOXIC MEDICATION: ICD-10-CM

## 2022-09-14 DIAGNOSIS — M06.00 SERONEGATIVE RHEUMATOID ARTHRITIS (HCC): ICD-10-CM

## 2022-09-14 DIAGNOSIS — Z15.89 HLA B27 (HLA B27 POSITIVE): ICD-10-CM

## 2022-09-14 DIAGNOSIS — M47.816 LUMBAR FACET ARTHROPATHY: ICD-10-CM

## 2022-09-15 RX ORDER — LEFLUNOMIDE 20 MG/1
TABLET ORAL
Qty: 30 TABLET | Refills: 0 | Status: SHIPPED | OUTPATIENT
Start: 2022-09-15

## 2022-09-19 ENCOUNTER — OFFICE VISIT (OUTPATIENT)
Dept: RHEUMATOLOGY | Age: 52
End: 2022-09-19
Payer: MEDICARE

## 2022-09-19 VITALS
DIASTOLIC BLOOD PRESSURE: 101 MMHG | RESPIRATION RATE: 16 BRPM | BODY MASS INDEX: 34.06 KG/M2 | SYSTOLIC BLOOD PRESSURE: 168 MMHG | WEIGHT: 224 LBS | OXYGEN SATURATION: 98 %

## 2022-09-19 DIAGNOSIS — Z15.89 HLA B27 (HLA B27 POSITIVE): ICD-10-CM

## 2022-09-19 DIAGNOSIS — M47.812 ARTHROPATHY OF CERVICAL FACET JOINT: ICD-10-CM

## 2022-09-19 DIAGNOSIS — M06.00 SERONEGATIVE RHEUMATOID ARTHRITIS (HCC): ICD-10-CM

## 2022-09-19 DIAGNOSIS — Z79.899 ONGOING USE OF POSSIBLY TOXIC MEDICATION: ICD-10-CM

## 2022-09-19 DIAGNOSIS — M70.61 TROCHANTERIC BURSITIS OF BOTH HIPS: ICD-10-CM

## 2022-09-19 DIAGNOSIS — M54.32 SCIATICA, LEFT SIDE: Primary | ICD-10-CM

## 2022-09-19 DIAGNOSIS — M70.62 TROCHANTERIC BURSITIS OF BOTH HIPS: ICD-10-CM

## 2022-09-19 PROCEDURE — 3017F COLORECTAL CA SCREEN DOC REV: CPT | Performed by: INTERNAL MEDICINE

## 2022-09-19 PROCEDURE — G8427 DOCREV CUR MEDS BY ELIG CLIN: HCPCS | Performed by: INTERNAL MEDICINE

## 2022-09-19 PROCEDURE — G8417 CALC BMI ABV UP PARAM F/U: HCPCS | Performed by: INTERNAL MEDICINE

## 2022-09-19 PROCEDURE — 99215 OFFICE O/P EST HI 40 MIN: CPT | Performed by: INTERNAL MEDICINE

## 2022-09-19 PROCEDURE — G9899 SCRN MAM PERF RSLTS DOC: HCPCS | Performed by: INTERNAL MEDICINE

## 2022-09-19 PROCEDURE — G9717 DOC PT DX DEP/BP F/U NT REQ: HCPCS | Performed by: INTERNAL MEDICINE

## 2022-09-19 NOTE — PROGRESS NOTES
REASON FOR VISIT:   Shasta Amaral is a 46 y.o. female with history of depression, PTSD, non-crystal proven gout, and hypertension who is returning for followup of HLA-B27+ seronegative rheumatoid arthritis. HISTORY OF PRESENT ILLNESS    Pt returns for a follow up. She says that she is still in a lot of pain. Pt takes one pill of Leflunomide daily. She takes a Humira shot once every 14 days. Pt reports that the other day she felt a shooting pain in her knee and her knee buckled while walking. Pt says that she still has a lot of back pain, neck pain, and leg pain. She says that she has a hard time sleeping at night because her pain radiated down her legs. She also reports that she has a lot of hand pain. She says that she had both her of her hands injected with steroids and she is waiting for the steroids to kick in. She takes 800mg of Ibuprofen \"several times a day\" and at least 1,000mg of Tylenol daily. She says that these medications do not help much. Pt says that it takes her 1-2 hours to loosen up in the morning. Pt notes that she lost 25 pounds. She says that she achieved this mostly through diet because it is hard for her to do high-calorie burning activities. Pt denies eye redness and pain, tattoo nodules. REVIEW OF SYSTEMS  A comprehensive review of systems was negative except for that written in the HPI. A 10-point review of systems is per the new patient questionnaire, which has been reviewed extensively and scanned into the electronic medical record for future reference. Review of systems is as above and is otherwise negative.     ALLERGIES  Methotrexate and Percocet [oxycodone-acetaminophen]    MEDICATIONS  Current Outpatient Medications   Medication Sig    leflunomide (ARAVA) 20 mg tablet TAKE 1/2 (ONE-HALF) TABLET BY MOUTH ONCE DAILY FOR 7 DAYS THEN 1 ONCE DAILY IF  TOLERATED    adalimumab (Humira,CF, Pen) 40 mg/0.4 mL injection pen 0.4 mL by SubCUTAneous route every fourteen (14) days. Indications: rheumatoid arthritis    adalimumab (Humira,CF, Pen) 40 mg/0.4 mL injection pen 0.4 mL by SubCUTAneous route every fourteen (14) days. Indications: rheumatoid arthritis    cholecalciferol (VITAMIN D3) (2,000 UNITS /50 MCG) cap capsule Take 1 Capsule by mouth daily. (Patient not taking: Reported on 8/23/2022)    omeprazole (PRILOSEC) 20 mg capsule Take 1 Capsule by mouth daily. divalproex DR (DEPAKOTE) 250 mg tablet Take 500 mg by mouth daily. (Patient not taking: Reported on 8/23/2022)    lurasidone (LATUDA) 20 mg tab tablet Take 60 mg by mouth daily. (Patient not taking: Reported on 8/23/2022)    cyanocobalamin (VITAMIN B12) 1,000 mcg/mL injection 1,000 mcg by IntraMUSCular route every thirty (30) days. (Patient not taking: No sig reported)    gabapentin (NEURONTIN) 600 mg tablet Take 1 Tab by mouth three (3) times daily. Max Daily Amount: 1,800 mg. (Patient not taking: Reported on 8/23/2022)    venlafaxine-SR (Effexor XR) 150 mg capsule Take 1 Cap by mouth daily. (Patient not taking: Reported on 8/23/2022)    buPROPion SR (WELLBUTRIN, ZYBAN) 200 mg SR tablet Take 1 Tab by mouth two (2) times a day. Take 200 mg by mouth two (2) times a day. (Patient not taking: Reported on 8/23/2022)    propranoloL (INDERAL) 10 mg tablet Take 1 Tab by mouth three (3) times daily. (Patient not taking: No sig reported)    traZODone (DESYREL) 150 mg tablet Take 1 Tab by mouth nightly. dextroamphetamine-amphetamine (ADDERALL) 20 mg tablet Take 30 mg by mouth two (2) times a day. (Patient not taking: Reported on 8/23/2022)     No current facility-administered medications for this visit.        PAST MEDICAL HISTORY  Past Medical History:   Diagnosis Date    Anxiety     Bipolar affect, depressed ([facility] [address]     Chronic joint pain     COVID-19 03/2021    GERD (gastroesophageal reflux disease)     PTSD (post-traumatic stress disorder)     RA (rheumatoid arthritis) ([facility] [address]        FAMILY HISTORY  family history includes Arthritis in her mother; Arthritis-rheumatoid in her mother, paternal aunt, and sister; Cancer in her father; Dementia in her mother; Diabetes in her father; Gout in her maternal grandmother; Heart Disease in her father; Lupus in her paternal uncle; Osteoporosis in her mother; Other in her father; Psychiatric Disorder in her mother. SOCIAL HISTORY  She  reports that she quit smoking about 2 years ago. Her smoking use included cigarettes. She has a 35.00 pack-year smoking history. She has quit using smokeless tobacco. She reports current alcohol use. She reports current drug use. Drug: Marijuana. 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 Born and raised in [place]. Son lives in [place]. Drinks a glass of wine once or twice a month. Quit smoking after 1/2 PPD x35yr. DATA  Visit Vitals  BP (!) 168/101 (BP 1 Location: Right upper arm, BP Patient Position: Sitting, BP Cuff Size: Large adult) Comment: patient reported   Resp 16   Wt 224 lb (101.6 kg)   SpO2 98%   BMI 34.06 kg/m²       mHAQ 1.250  Patient pain 75/100  Patient global 75/100  MD global: 3/10  General:  The patient is pleasant, obese, well nourished, alert, down-appearing, but in no apparent distress. Eyes: Sclera are anicteric. No conjunctival injection. HEENT:  Oropharynx is clear. No oral ulcers. Adequate salivary pooling. No cervical or supraclavicular lymphadenopathy. Lungs:  Clear to auscultation bilaterally, without wheeze or stridor. Normal respiratory effort. Cor:  Regular rate and rhythm. No murmurs rubs or gallops. Abdomen: Sities: No calf tenderness or edema. Warm and well perfused. Skin:  No significant abnormalitiesoft, non-tender, without hepatomegaly or masses. Extrem. No petechial, purpuric, or psoriaform rashes. Normal nails. No sclerodactyly. Neuro: No focal muscle wasting of hands.  No foot or wrist drop. Negative SLR bilaterally. Musculoskeletal:    A comprehensive musculoskeletal exam was performed for all joints of each upper and lower extremity and assessed for swelling, tenderness and range of motion. Results are documented as below:  Intact ROM in wrist with still bilateral wrist joint line tenderness. No triggering of right thumb today  Mild bilateral CMC tenderness. Negative MTP squeeze tenderness  Bilateral SI direct tenderness, paralumbar tenderness, RAMONA localizes to ipsilateral hip. Left trochanteric tenderness. No evidence of synovitis in the shoulders, elbows, hips, knees or ankles. mShober 15->20cm previously, not repeated today      Labs:   22: Adalimumab level 3.4, ESR 8, Cr 0.67, LFT WNL, CBC WNL, CRP 6 mg/L  22: Vit D 27.4, B12 575   21: CBC WNL; ESR 29; Cr 0.76, LFTs WNL, CRP 9mg/L  21 B12 575; Vit D 27  21 CBC WNL; ESR 29; Cr 0.76, LFTs WNL; CRP 9mg/L  3/4/21: CMP WNL, ESR 18, CRP 10mg/L  20: ESR 19, CMP WNL (Cr 098), CBC WNL, uric acid 7.0  20: Uric acid 7.5, CCP negative, RF negative, B12 183, SHERON negative, HLA-B27+    Imagin/23/22 XR SPINE LUMB 2 or 3 V: Lower lumbar degenerative disease. No findings of ankylosing spondylitis. 22 XR BIPS BI W OR WO AP PELV: No acute abnormality    22 MR L wrist:  FINDINGS: Bone marrow: No acute fracture, dislocation, or marrow replacing  process. Subtle cystic changes/ganglion formation in the capitate and lunate  Articular cartilage and fluid: High-grade to full-thickness chondral loss,  scattered prominent subchondral cystic changes/marrow edema, bony productive  change, and joint effusion with synovitis at the thumb carpometacarpal  articulation. Scattered subchondral cystic changes and marrow edema at the triscaphe articulation. Chondral signal heterogeneity and high-grade thinning of the radiocarpal and intercarpal articulations. Small distal radioulnar joint effusion with synovitis.  No discrete osseous erosions identified. Triangular fibrocartilage complex: Focal thinning of the disc (5-14), likely degenerative. Sprain versus degeneration at the foveal and styloid attachments. Lunotriquetral and scapholunate ligaments: appear intact on this study. Extrinsic ligaments: Sprain versus degeneration of the dorsal capsule ligaments. Extensor tendons: Marked tendinosis of the ECU tendon at the level of the ulnar  styloid. Moderate tendinosis with possible intrasubstance tear of the first  extensor compartment tendons. Flexor tendons: Intact. Muscles: Within normal limits. Carpal tunnel and Guyon's canal: Thickening of the median nerve and palmar  bowing of the flexor retinaculum, compatible with carpal tunnel syndrome. Subtle  increased signal of the ulnar nerve, correlate for associated neuropathy. Soft tissue mass: An approximately 16 mm x 9 mm x 12 mm lobulated multiloculated ncomplex ganglion arising from the volar radial aspect of the radiocarpal articulation (8-18, 4-18)  IMPRESSION  1. Moderate to marked chondrosis and osteoarthritis involving the visualized wrist, most pronounced at the thumb carpometacarpal articulation. No discrete osseous erosions to suggest inflammatory arthropathy. 2.  Marked extensor carpi ulnaris tendinosis. Moderate tendinosis with possible intrasubstance tear of the first extensor compartment tendons. 3.  Degenerative thinning of the disc and sprain versus degeneration at the foveal and styloid attachments of the triangular fibrocartilage complex. 4.  Thickening of the median nerve and palmar bowing of the flexor retinaculum, compatible with carpal tunnel syndrome. Subtle increased signal of the ulnar nerve, correlate for associated neuropathy. 5.  Lobulated multiloculated complex ganglion at the volar radial aspect of the radiocarpal articulation.     3/4/21 Lspine xray: Prominent chronic disc degeneration L5-S1 with some posterior element hypertrophy. 3/4/21 XR hands, feet, knees, pelvis: No bony erosions, periarticular osteopenia, or chondrocalcinosis. Corrinne Bologna  Ms. Clifton Rae is a 46 y.o. female who presents for evaluation of HLA-B27+ seronegative rheumatoid arthritis with flaring inflammatory arthritis since running out of leflunomide, and inflammatory-character low back pain though with significant overlay of facet arthropathy. Plain films s/f loss of disk height at L4/5, with more prominent left-sided sciatica currently. Referring to Pain Management and physical therapy. 1. Sciatica, left side  - REFERRAL TO PAIN MANAGEMENT  - REFERRAL TO PHYSICAL THERAPY    2. Arthropathy of cervical facet joint  - REFERRAL TO PAIN MANAGEMENT  - REFERRAL TO PHYSICAL THERAPY    3. Trochanteric bursitis of both hips  - REFERRAL TO PHYSICAL THERAPY    4. Seronegative rheumatoid arthritis (HCC)  - C REACTIVE PROTEIN, QT; Future  - CBC WITH AUTOMATED DIFF; Future  - METABOLIC PANEL, COMPREHENSIVE; Future  - SED RATE (ESR); Future    5. HLA B27 (HLA B27 positive)  - No clinical inflammatory back pain currently, continuing current Humira (adalimumab) and leflunomide. 6. Ongoing use of possibly toxic medication  - CBC WITH AUTOMATED DIFF; Future  - METABOLIC PANEL, COMPREHENSIVE; Future    Patient Instructions   1) Continue to get your Humira shot every 14 days. 2) Continue to take 1 pill of Leflunomide daily. 3) You can take 1000mg of Tylenol 2-3 times per day. You can also take 3-4 pills of Ibuprofen per day but try not to take this more than 3 days out of the week. 4) Some people find Marlon Chi beneficial for pain management. You can search for Sheeba Tena on you tube and follow along with him or try and find a class at your local [facility]      5) Continue to work on weight loss. Remember that weight loss mostly comes down to achieving a caloric deficit through diet. Congratulations on your progress so far.      6) I am going to refer you to physical therapy. You can take this referral to wherever is most convenient to you. Ask them to try a tens unit for your back. 7) I am referring you to Dr Noemi Garcia who is a pain management doctor but you can use this wherever your insurance is accepted. 8) Check labs in 2 months before next visit. 9) Follow up in 2 months. Let me know if you have any questions or concerns in the meantime. Orders Placed This Encounter    C REACTIVE PROTEIN, QT    CBC WITH AUTOMATED DIFF    METABOLIC PANEL, COMPREHENSIVE    SED RATE (ESR)    REFERRAL TO PAIN MANAGEMENT    REFERRAL TO PHYSICAL THERAPY       Medications: I am having Rebecca Shukla maintain her dextroamphetamine-amphetamine, buPROPion SR, propranoloL, traZODone, venlafaxine-SR, cyanocobalamin, gabapentin, omeprazole, divalproex DR, lurasidone, cholecalciferol, Humira(CF) Pen, Humira(CF) Pen, and leflunomide. Follow up: Return in about 2 months (around 11/19/2022). Face to face time: 30 minutes  Note preparation and records review day of service: 20 minutes  Total provider time day of service:  50 minutes    This was scribed by Mihaela Mensah in the presence of Dr. Evan Fierro. The note was reviewed and amended personally, and I agree with the above information.     Skyler Bruno MD    Adult Rheumatology   [facility]  A Part of [facility], [address] [place] [address]   Phone [phone]  Fax [phone]

## 2022-09-19 NOTE — PATIENT INSTRUCTIONS
1) Continue to get your Humira shot every 14 days. 2) Continue to take 1 pill of Leflunomide daily. 3) You can take 1000mg of Tylenol 2-3 times per day. You can also take 3-4 pills of Ibuprofen per day but try not to take this more than 3 days out of the week. 4) Some people find Marlon Chi beneficial for pain management. You can search for Arabella Tomas on you tube and follow along with him or try and find a class at your local [facility]      5) Continue to work on weight loss. Remember that weight loss mostly comes down to achieving a caloric deficit through diet. Congratulations on your progress so far. 6) I am going to refer you to physical therapy. You can take this referral to wherever is most convenient to you. Ask them to try a tens unit for your back. 7) I am referring you to Dr Radha Kaminski who is a pain management doctor but you can use this wherever your insurance is accepted. 8) Check labs in 2 months before next visit. 9) Follow up in 2 months. Let me know if you have any questions or concerns in the meantime.

## 2022-10-12 ENCOUNTER — TELEPHONE (OUTPATIENT)
Dept: RHEUMATOLOGY | Age: 52
End: 2022-10-12

## 2022-10-12 NOTE — TELEPHONE ENCOUNTER
Patient call stated the physician Mauri Thomas referred her to Dr. Gilda Moreland do not take her insurance and she would need another referral please call patient and advise [phone].  Sdh

## 2022-10-12 NOTE — TELEPHONE ENCOUNTER
Spoke to pt informed pt that she will have to contact her insurance company to find out which pain management physician is in network with her insurance company.  Pt verbally acknowledged understanding

## 2022-11-15 ENCOUNTER — OFFICE VISIT (OUTPATIENT)
Dept: INTERNAL MEDICINE CLINIC | Age: 52
End: 2022-11-15
Payer: COMMERCIAL

## 2022-11-15 VITALS
HEIGHT: 67 IN | BODY MASS INDEX: 36.05 KG/M2 | WEIGHT: 229.7 LBS | HEART RATE: 89 BPM | OXYGEN SATURATION: 100 % | SYSTOLIC BLOOD PRESSURE: 133 MMHG | DIASTOLIC BLOOD PRESSURE: 80 MMHG | RESPIRATION RATE: 16 BRPM | TEMPERATURE: 98.3 F

## 2022-11-15 DIAGNOSIS — M05.9 RHEUMATOID ARTHRITIS WITH POSITIVE RHEUMATOID FACTOR, INVOLVING UNSPECIFIED SITE (HCC): ICD-10-CM

## 2022-11-15 DIAGNOSIS — E78.5 DYSLIPIDEMIA: ICD-10-CM

## 2022-11-15 DIAGNOSIS — R73.02 IMPAIRED GLUCOSE TOLERANCE: ICD-10-CM

## 2022-11-15 DIAGNOSIS — Z00.00 PHYSICAL EXAM: ICD-10-CM

## 2022-11-15 DIAGNOSIS — E66.01 SEVERE OBESITY (BMI 35.0-39.9) WITH COMORBIDITY (HCC): Primary | ICD-10-CM

## 2022-11-15 DIAGNOSIS — F31.31 BIPOLAR AFFECTIVE DISORDER, CURRENTLY DEPRESSED, MILD (HCC): ICD-10-CM

## 2022-11-15 RX ORDER — METFORMIN HYDROCHLORIDE 850 MG/1
TABLET ORAL
COMMUNITY
Start: 2022-10-20

## 2022-11-15 NOTE — PROGRESS NOTES
Alberto Wilkinson is a 46 y.o. female  Chief Complaint   Patient presents with    [facility] Care     Patient here to Establish Care/Annual Wellness exam.     Annual Wellness Visit     1. Have you been to the ER, urgent care clinic since your last visit? Hospitalized since your last visit? No    2. Have you seen or consulted any other health care providers outside of the [address] [place] [address] since your last visit? Include any pap smears or colon screening. Yes When: 09/19/2022 Where: Rheumatology Dr. Merry Patel  Reason for visit: RA and joint pain.

## 2022-11-16 LAB
ALBUMIN SERPL-MCNC: 3.6 G/DL (ref 3.5–5)
ALBUMIN/GLOB SERPL: 1 {RATIO} (ref 1.1–2.2)
ALP SERPL-CCNC: 79 U/L (ref 45–117)
ALT SERPL-CCNC: 26 U/L (ref 12–78)
ANION GAP SERPL CALC-SCNC: 6 MMOL/L (ref 5–15)
APPEARANCE UR: CLEAR
AST SERPL-CCNC: 10 U/L (ref 15–37)
BASOPHILS # BLD: 0.1 K/UL (ref 0–0.1)
BASOPHILS NFR BLD: 1 % (ref 0–1)
BILIRUB SERPL-MCNC: 0.2 MG/DL (ref 0.2–1)
BILIRUB UR QL: NEGATIVE
BUN SERPL-MCNC: 13 MG/DL (ref 6–20)
BUN/CREAT SERPL: 16 (ref 12–20)
CALCIUM SERPL-MCNC: 9.3 MG/DL (ref 8.5–10.1)
CHLORIDE SERPL-SCNC: 103 MMOL/L (ref 97–108)
CHOLEST SERPL-MCNC: 230 MG/DL
CO2 SERPL-SCNC: 32 MMOL/L (ref 21–32)
COLOR UR: NORMAL
CREAT SERPL-MCNC: 0.83 MG/DL (ref 0.55–1.02)
CRP SERPL HS-MCNC: 8.9 MG/L
DIFFERENTIAL METHOD BLD: NORMAL
EOSINOPHIL # BLD: 0.3 K/UL (ref 0–0.4)
EOSINOPHIL NFR BLD: 3 % (ref 0–7)
ERYTHROCYTE [DISTWIDTH] IN BLOOD BY AUTOMATED COUNT: 13.1 % (ref 11.5–14.5)
EST. AVERAGE GLUCOSE BLD GHB EST-MCNC: 123 MG/DL
GLOBULIN SER CALC-MCNC: 3.6 G/DL (ref 2–4)
GLUCOSE SERPL-MCNC: 78 MG/DL (ref 65–100)
GLUCOSE UR STRIP.AUTO-MCNC: NEGATIVE MG/DL
HBA1C MFR BLD: 5.9 % (ref 4–5.6)
HCT VFR BLD AUTO: 44.5 % (ref 35–47)
HDLC SERPL-MCNC: 76 MG/DL
HDLC SERPL: 3 {RATIO} (ref 0–5)
HGB BLD-MCNC: 13.6 G/DL (ref 11.5–16)
HGB UR QL STRIP: NEGATIVE
IMM GRANULOCYTES # BLD AUTO: 0 K/UL (ref 0–0.04)
IMM GRANULOCYTES NFR BLD AUTO: 0 % (ref 0–0.5)
KETONES UR QL STRIP.AUTO: NEGATIVE MG/DL
LDLC SERPL CALC-MCNC: 122.4 MG/DL (ref 0–100)
LEUKOCYTE ESTERASE UR QL STRIP.AUTO: NEGATIVE
LYMPHOCYTES # BLD: 2 K/UL (ref 0.8–3.5)
LYMPHOCYTES NFR BLD: 25 % (ref 12–49)
MCH RBC QN AUTO: 28.4 PG (ref 26–34)
MCHC RBC AUTO-ENTMCNC: 30.6 G/DL (ref 30–36.5)
MCV RBC AUTO: 92.9 FL (ref 80–99)
MONOCYTES # BLD: 0.5 K/UL (ref 0–1)
MONOCYTES NFR BLD: 6 % (ref 5–13)
NEUTS SEG # BLD: 5.2 K/UL (ref 1.8–8)
NEUTS SEG NFR BLD: 65 % (ref 32–75)
NITRITE UR QL STRIP.AUTO: NEGATIVE
NRBC # BLD: 0 K/UL (ref 0–0.01)
NRBC BLD-RTO: 0 PER 100 WBC
PH UR STRIP: 6.5 [PH] (ref 5–8)
PLATELET # BLD AUTO: 323 K/UL (ref 150–400)
PMV BLD AUTO: 10.2 FL (ref 8.9–12.9)
POTASSIUM SERPL-SCNC: 4 MMOL/L (ref 3.5–5.1)
PROT SERPL-MCNC: 7.2 G/DL (ref 6.4–8.2)
PROT UR STRIP-MCNC: NEGATIVE MG/DL
RBC # BLD AUTO: 4.79 M/UL (ref 3.8–5.2)
SODIUM SERPL-SCNC: 141 MMOL/L (ref 136–145)
SP GR UR REFRACTOMETRY: 1.02 (ref 1–1.03)
TRIGL SERPL-MCNC: 158 MG/DL (ref ?–150)
TSH SERPL DL<=0.05 MIU/L-ACNC: 0.39 UIU/ML (ref 0.36–3.74)
UROBILINOGEN UR QL STRIP.AUTO: 0.2 EU/DL (ref 0.2–1)
VLDLC SERPL CALC-MCNC: 31.6 MG/DL
WBC # BLD AUTO: 8 K/UL (ref 3.6–11)

## 2022-11-17 LAB
AMPHETAMINES UR QL SCN: NEGATIVE NG/ML
APO B SERPL-MCNC: 106 MG/DL
BZE UR QL: NEGATIVE NG/ML
CANNABINOIDS UR QL SCN: NEGATIVE NG/ML
DRUG SCREEN COMMENT:, 753798: NORMAL
OPIATES UR QL SCN: NEGATIVE NG/ML
PCP UR QL: NEGATIVE NG/ML

## 2022-11-20 PROBLEM — E78.5 DYSLIPIDEMIA: Status: ACTIVE | Noted: 2022-11-20

## 2022-11-20 PROBLEM — R73.02 IMPAIRED GLUCOSE TOLERANCE: Status: ACTIVE | Noted: 2022-11-20

## 2022-12-05 ENCOUNTER — OFFICE VISIT (OUTPATIENT)
Dept: RHEUMATOLOGY | Age: 52
End: 2022-12-05
Payer: COMMERCIAL

## 2022-12-05 VITALS
BODY MASS INDEX: 34.71 KG/M2 | TEMPERATURE: 98 F | DIASTOLIC BLOOD PRESSURE: 78 MMHG | SYSTOLIC BLOOD PRESSURE: 117 MMHG | RESPIRATION RATE: 16 BRPM | HEART RATE: 98 BPM | WEIGHT: 229 LBS | OXYGEN SATURATION: 96 % | HEIGHT: 68 IN

## 2022-12-05 DIAGNOSIS — M47.819 PERIPHERAL SPONDYLOARTHRITIS: Primary | ICD-10-CM

## 2022-12-05 DIAGNOSIS — M79.7 FIBROMYALGIA: ICD-10-CM

## 2022-12-05 PROCEDURE — 99215 OFFICE O/P EST HI 40 MIN: CPT | Performed by: INTERNAL MEDICINE

## 2022-12-05 NOTE — PROGRESS NOTES
REASON FOR VISIT:   Zuleyka Bartholomew is a 46 y.o. female with history of depression, PTSD, non-crystal proven gout, and hypertension who is returning for followup of HLA-B27+ peripheral spondyloarthritis. HISTORY OF PRESENT ILLNESS    Pt returns for a follow up. LV 9/19/2022. Pt takes Humira injections every 14 days. She says that she feels like her Humira is wearing off a few days after she takes her injection. She stopped taking her Leflunomide after LV. She says that she did not feel that this medication was helping her joint pain. She takes Ibuprofen and Tylenol regularly. Pt presents with joint pain and fatigue today. She says that she constantly aches. 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 Pt notes that her L knee has been bothering her, especially when going up and down stairs. She says that she is often afraid it is going to give out on her. Pt has been doing physical therapy for her back, neck, and shoulders. She says that this does not seem to be helping her, and it may even be making some of her pains worse. Pt can not find a pain management doctor that will accept her insurance. Has not yet tried contacting insurance directly. Pt says that she goes to the [employer] and goes in the pool twice a week which has been more helpful than PT. Pt quit smoking cigarettes 3 years ago. REVIEW OF SYSTEMS  A comprehensive review of systems was negative except for that written in the HPI. A 10-point review of systems is per the new patient questionnaire, which has been reviewed extensively and scanned into the electronic medical record for future reference. Review of systems is as above and is otherwise negative.     ALLERGIES  Methotrexate and Percocet [oxycodone-acetaminophen]    MEDICATIONS  Current Outpatient Medications   Medication Sig    metFORMIN (GLUCOPHAGE) 850 mg tablet TAKE 1 TABLET BY MOUTH TWICE DAILY WITH BREAKFAST AND WITH SUPPER FOR 30 DAYS    leflunomide (ARAVA) 20 mg tablet TAKE 1/2 (ONE-HALF) TABLET BY MOUTH ONCE DAILY FOR 7 DAYS THEN 1 ONCE DAILY IF  TOLERATED (Patient not taking: Reported on 11/15/2022)    adalimumab (Humira,CF, Pen) 40 mg/0.4 mL injection pen 0.4 mL by SubCUTAneous route every fourteen (14) days. Indications: rheumatoid arthritis    cholecalciferol (VITAMIN D3) (2,000 UNITS /50 MCG) cap capsule Take 1 Capsule by mouth daily. (Patient not taking: No sig reported)    omeprazole (PRILOSEC) 20 mg capsule Take 1 Capsule by mouth daily. divalproex DR (DEPAKOTE) 250 mg tablet Take 500 mg by mouth daily. (Patient not taking: No sig reported)    lurasidone (LATUDA) 20 mg tab tablet Take 60 mg by mouth daily. (Patient not taking: No sig reported)    cyanocobalamin (VITAMIN B12) 1,000 mcg/mL injection 1,000 mcg by IntraMUSCular route every thirty (30) days. (Patient not taking: No sig reported)    gabapentin (NEURONTIN) 600 mg tablet Take 1 Tab by mouth three (3) times daily. Max Daily Amount: 1,800 mg. (Patient not taking: No sig reported)    venlafaxine-SR (Effexor XR) 150 mg capsule Take 1 Cap by mouth daily. (Patient not taking: No sig reported)    buPROPion SR (WELLBUTRIN, ZYBAN) 200 mg SR tablet Take 1 Tab by mouth two (2) times a day. Take 200 mg by mouth two (2) times a day. (Patient not taking: No sig reported)    propranoloL (INDERAL) 10 mg tablet Take 1 Tab by mouth three (3) times daily. (Patient not taking: No sig reported)    traZODone (DESYREL) 150 mg tablet Take 1 Tab by mouth nightly. dextroamphetamine-amphetamine (ADDERALL) 20 mg tablet Take 30 mg by mouth two (2) times a day. (Patient not taking: No sig reported)     No current facility-administered medications for this visit.        PAST MEDICAL HISTORY  Past Medical History:   Diagnosis Date    Bipolar affect, depressed ([facility] [address]     Chronic joint pain     COVID-19 03/2021    GERD (gastroesophageal reflux disease)     PTSD (post-traumatic stress disorder) RA (rheumatoid arthritis) (formerly [facility])        FAMILY HISTORY  family history includes Arthritis in her mother; Arthritis-rheumatoid in her mother, paternal aunt, and sister; Cancer in her father; Dementia in her mother; Diabetes in her father; Gout in her maternal grandmother; Heart Disease in her father; Lupus in her paternal uncle; Osteoporosis in her mother; Other in her father; Psychiatric Disorder in her mother. SOCIAL HISTORY  She  reports that she quit smoking about 2 years ago. Her smoking use included cigarettes. She has a 35.00 pack-year smoking history. She has quit using smokeless tobacco. She reports current alcohol use. She reports current drug use. Drug: Marijuana. 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 Born and raised in [place]. Son lives in [place]. Drinks a glass of wine once or twice a month. Quit smoking after 1/2 PPD x35yr. DATA  Visit Vitals  /78   Pulse 98   Temp 98 °F (36.7 °C) (Oral)   Resp 16   Ht 5' 8\" (1.727 m)   Wt 229 lb (103.9 kg)   SpO2 96%   BMI 34.82 kg/m²       mHAQ 1.625  Patient pain 70/100  Patient global 75/100  MD global: 3/10  General:  The patient is pleasant, obese, well nourished, alert, down-appearing, but in no apparent distress. Eyes: Sclera are anicteric. No conjunctival injection. HEENT:  Oropharynx is clear. No oral ulcers. Adequate salivary pooling. No cervical or supraclavicular lymphadenopathy. Lungs:  Clear to auscultation bilaterally, without wheeze or stridor. Normal respiratory effort. Cor:  Regular rate and rhythm. No murmurs rubs or gallops. Abdomen: Sities: No calf tenderness or edema. Warm and well perfused. Skin:  No significant abnormalitiesoft, non-tender, without hepatomegaly or masses. Extrem. No petechial, purpuric, or psoriaform rashes. Normal nails. No sclerodactyly.   Neuro: No focal muscle wasting of hands. No foot or wrist drop. Negative SLR bilaterally. Musculoskeletal:    A comprehensive musculoskeletal exam was performed for all joints of each upper and lower extremity and assessed for swelling, tenderness and range of motion. Results are documented as below:  Pan-positive FMTP  Intact ROM in wrist with persistent bilateral wrist joint line tenderness. No triggering of right thumb today  Biilateral CMC tenderness. Negative MTP squeeze tenderness  Unchanged bilateral SI direct tenderness, paralumbar tenderness, RAMONA not re-challenged  Left trochanteric tenderness. No evidence of synovitis in the shoulders, elbows, hips, knees or ankles. mShober 15->20cm previously, not repeated today      Labs:   11/15/22: HGB A1c 5.9, CBC WNL, Apolipoprotein B 106, CRP 8.9 mg/L, Cholesterol 230, Triglyceride 158, .4, Cr 0.83, LFT WNL, TSH 0.39, UA WNL,   22: Adalimumab level 3.4, ESR 8, Cr 0.67, LFT WNL, CBC WNL, CRP 6 mg/L  22: Vit D 27.4, B12 575   21: CBC WNL; ESR 29; Cr 0.76, LFTs WNL, CRP 9mg/L  21 B12 575; Vit D 27  21 CBC WNL; ESR 29; Cr 0.76, LFTs WNL; CRP 9mg/L  3/4/21: CMP WNL, ESR 18, CRP 10mg/L  20: ESR 19, CMP WNL (Cr 098), CBC WNL, uric acid 7.0  20: Uric acid 7.5, CCP negative, RF negative, B12 183, SHERON negative, HLA-B27+    Imagin/23/22 XR SPINE LUMB 2 or 3 V: Lower lumbar degenerative disease. No findings of ankylosing spondylitis. 22 XR BIPS BI W OR WO AP PELV: No acute abnormality    22 MR L wrist:  FINDINGS: Bone marrow: No acute fracture, dislocation, or marrow replacing  process. Subtle cystic changes/ganglion formation in the capitate and lunate  Articular cartilage and fluid: High-grade to full-thickness chondral loss,  scattered prominent subchondral cystic changes/marrow edema, bony productive  change, and joint effusion with synovitis at the thumb carpometacarpal  articulation.  Scattered subchondral cystic changes and marrow edema at the triscaphe articulation. Chondral signal heterogeneity and high-grade thinning of the radiocarpal and intercarpal articulations. Small distal radioulnar joint effusion with synovitis. No discrete osseous erosions identified. Triangular fibrocartilage complex: Focal thinning of the disc (5-14), likely degenerative. Sprain versus degeneration at the foveal and styloid attachments. Lunotriquetral and scapholunate ligaments: appear intact on this study. Extrinsic ligaments: Sprain versus degeneration of the dorsal capsule ligaments. Extensor tendons: Marked tendinosis of the ECU tendon at the level of the ulnar  styloid. Moderate tendinosis with possible intrasubstance tear of the first  extensor compartment tendons. Flexor tendons: Intact. Muscles: Within normal limits. Carpal tunnel and Guyon's canal: Thickening of the median nerve and palmar  bowing of the flexor retinaculum, compatible with carpal tunnel syndrome. Subtle  increased signal of the ulnar nerve, correlate for associated neuropathy. Soft tissue mass: An approximately 16 mm x 9 mm x 12 mm lobulated multiloculated ncomplex ganglion arising from the volar radial aspect of the radiocarpal articulation (8-18, 4-18)  IMPRESSION  1. Moderate to marked chondrosis and osteoarthritis involving the visualized wrist, most pronounced at the thumb carpometacarpal articulation. No discrete osseous erosions to suggest inflammatory arthropathy. 2.  Marked extensor carpi ulnaris tendinosis. Moderate tendinosis with possible intrasubstance tear of the first extensor compartment tendons. 3.  Degenerative thinning of the disc and sprain versus degeneration at the foveal and styloid attachments of the triangular fibrocartilage complex. 4.  Thickening of the median nerve and palmar bowing of the flexor retinaculum, compatible with carpal tunnel syndrome. Subtle increased signal of the ulnar nerve, correlate for associated neuropathy.   5.  Lobulated multiloculated complex ganglion at the volar radial aspect of the radiocarpal articulation. 3/4/21 Lspine xray: Prominent chronic disc degeneration L5-S1 with some posterior element hypertrophy. 3/4/21 XR hands, feet, knees, pelvis: No bony erosions, periarticular osteopenia, or chondrocalcinosis. Hira Ding is a 46 y.o. female who presents for evaluation of HLA-B27+ peripheral spondyloarthropathy. No peripheral synovitis today, prominent overlay of central pain sensitization. Disappointing response to Humira to date, checking MRI to rule in sacroiliitis. No changes to immunosuppression for now. 1. Peripheral spondyloarthritis  - MRI PELV WO CONT; Future  - Cont Humira 40mg every other week    2. Fibromyalgia  -Fibromyalgia is a disease characterized by chronic widespread musculoskeletal pain. Fibromyalgia is caused by abnormal processing of pain signals in the central nervous system, leading to exaggerated pain responses. Non-pharmacologic therapies such as cardiovascular exercise and Cognitive Behavioral Therapy have been shown to be of benefit ([facility], Am J Med 2009). Marlon Chi in particular has proven efficacy in the treatment of fibromyalgia LONNIE Johnson 2010). If pharmacotherapy is pursued, pregabalin (Lyrica), gabapentin (Neurontin), milnacipran (Governor Tripp), and duloxetine (Cymbalta) are FDA approved medications for the treatment of fibromyalgia. Narcotics have not been proven to be efficacious in the treatment of fibromyalgia. In fact, narcotic use in this patient population has been observed to exacerbate depression, and may enhance the hyperalgesia which is characteristic of this condition (Keisha Drain Rheum 2006). They also are at increased risk for opioid-induced hyperalgesia due predominantly to central sensitization Cheryl Carlton al. [facility] Clin Rheumatol. 2013 Mar;19(2):72-7).  Specifically, a double-blind placebo-controlled trial by Addie richardson published in 1995 demonstrated that intravenous morphine did not reduce pain in fibromyalgia patients. A study by Mary Casper al published in 2003 showed that fibromyalgia patients taking oral opiates did not experience improvement in their pain at four years of follow up, and also reported increased depression over the last two years of the study. There is subsequent concern that the prolonged use of narcotics to treat fibromyalgia may cause harm to these patients Kiara Helm, Pain 2005). Opioid use in fibromyalgia had poorer symptoms and functional and occupational status compared to nonusers (Aleksander KILLIAN et al. Pain Res Treat. 0923;0386:839278). We therefore recommend that narcotics be avoided in all patients with fibromyalgia. Furthermore, naltrexone 4.5mg daily has been shown to improve daily pain scores in fibromyalgia in a randomized, controlled clinical trial (Arthritis Rheum. 2013 Feb;65(2):529-38); this can be prescribed through a compounding pharmacy and is a consideration for patients not already dependent on opiate-type medications. For all patients, we recommend Marlon Chi stretching exercises for at least 30 minutes per day. The Arth[employer] has made a videotape of [address] that she can borrow from [employer] purchase online or watch for free on [employer] Marlon Chi for Arthritis. We discussed treating secondary causes, such as sleep apnea, poor sleep quality, depression, anxiety, weight loss, vitamin deficiencies, such as vitamin D, and pursuing aquatherapy. I encouraged her to do [address]. My recommendations were provided to her and she was informed to follow up with her primary care physician for further management of her fibromyalgia. Patient Instructions   1) Continue to take your Humira injections every 14 days. 2) You can take 650mg of Tylenol up to 3 times a day for joint pain. You can take 600-800 of Ibuprofen up to twice daily for breakthrough joint pain but try not to take this everyday.      3) Continue to work on weight loss. Remember that every pound lost takes 4 pounds of pressure off of the knees. Weight loss mostly comes down to creating a caloric deficit through diet as opposed to exercise. 4) Continue to stay active. I think that exercising in the pool is a great idea. 5) Call your insurance to get a list of pain management doctors that accept your insurance. 6) I am ordering a MRI for you to get at your earliest convenience. Call [phone] to schedule. 7) Follow up in 4-6 weeks. Let me know if you have any questions or concerns in the meantime. Orders Placed This Encounter    MRI PELV WO CONT       Medications: I am having Ann Mcintosh maintain her dextroamphetamine-amphetamine, buPROPion SR, propranoloL, traZODone, venlafaxine-SR, cyanocobalamin, gabapentin, omeprazole, divalproex DR, lurasidone, cholecalciferol, Humira(CF) Pen, leflunomide, and metFORMIN. Follow up: Return in about 4 weeks (around 1/2/2023). Face to face time: 21 minutes  Note preparation and records review day of service: 20 minutes  Total provider time day of service: 41 minutes    This was scribed by Mabel Martinez in the presence of Dr. Ptarick Piña. The note was reviewed and amended personally, and I agree with the above information.     Adolfo King MD    Adult Rheumatology   [facility]  A Part of [facility], [address] [place] [address]   Phone [phone]  Fax [phone]

## 2022-12-05 NOTE — PROGRESS NOTES
Chief Complaint   Patient presents with    Joint Pain     1. Have you been to the ER, urgent care clinic since your last visit? Hospitalized since your last visit? No    2. Have you seen or consulted any other health care providers outside of the [address] [place] [address] since your last visit? Include any pap smears or colon screening.  No

## 2022-12-05 NOTE — PATIENT INSTRUCTIONS
1) Continue to take your Humira injections every 14 days. 2) You can take 650mg of Tylenol up to 3 times a day for joint pain. You can take 600-800 of Ibuprofen up to twice daily for breakthrough joint pain but try not to take this everyday. 3) Continue to work on weight loss. Remember that every pound lost takes 4 pounds of pressure off of the knees. Weight loss mostly comes down to creating a caloric deficit through diet as opposed to exercise. 4) Continue to stay active. I think that exercising in the pool is a great idea. 5) Call your insurance to get a list of pain management doctors that accept your insurance. 6) I am ordering a MRI for you to get at your earliest convenience. Call [phone] to schedule. 7) Follow up in 4-6 weeks. Let me know if you have any questions or concerns in the meantime.

## 2022-12-13 ENCOUNTER — HOSPITAL ENCOUNTER (OUTPATIENT)
Dept: MRI IMAGING | Age: 52
Discharge: HOME | End: 2022-12-13
Attending: INTERNAL MEDICINE
Payer: MEDICAID

## 2022-12-13 DIAGNOSIS — M47.819 PERIPHERAL SPONDYLOARTHRITIS: ICD-10-CM

## 2022-12-13 PROCEDURE — 72195 MRI PELVIS W/O DYE: CPT

## 2022-12-16 ENCOUNTER — OFFICE VISIT (OUTPATIENT)
Dept: INTERNAL MEDICINE CLINIC | Age: 52
End: 2022-12-16
Payer: MEDICAID

## 2022-12-16 VITALS
SYSTOLIC BLOOD PRESSURE: 115 MMHG | HEIGHT: 68 IN | WEIGHT: 232.6 LBS | HEART RATE: 85 BPM | RESPIRATION RATE: 16 BRPM | OXYGEN SATURATION: 99 % | DIASTOLIC BLOOD PRESSURE: 79 MMHG | TEMPERATURE: 98.4 F | BODY MASS INDEX: 35.25 KG/M2

## 2022-12-16 DIAGNOSIS — E66.01 SEVERE OBESITY (BMI 35.0-39.9) WITH COMORBIDITY (HCC): Primary | ICD-10-CM

## 2022-12-16 DIAGNOSIS — R73.02 IMPAIRED GLUCOSE TOLERANCE: ICD-10-CM

## 2022-12-16 DIAGNOSIS — M05.9 RHEUMATOID ARTHRITIS WITH POSITIVE RHEUMATOID FACTOR, INVOLVING UNSPECIFIED SITE (HCC): ICD-10-CM

## 2022-12-16 DIAGNOSIS — G89.29 CHRONIC JOINT PAIN: ICD-10-CM

## 2022-12-16 DIAGNOSIS — M25.50 CHRONIC JOINT PAIN: ICD-10-CM

## 2022-12-16 PROCEDURE — 99214 OFFICE O/P EST MOD 30 MIN: CPT | Performed by: INTERNAL MEDICINE

## 2022-12-16 NOTE — PROGRESS NOTES
Harinder Johnson is a 46 y.o. female  Chief Complaint   Patient presents with    Follow-up    Cholesterol Problem     Patient here for follow up cholesterol. 1. Have you been to the ER, urgent care clinic since your last visit? Hospitalized since your last visit? No    2. Have you seen or consulted any other health care providers outside of the [address] [place] [address] since your last visit? Include any pap smears or colon screening. Yes When: 12/05/22 Where: Rheumatologist  Reason for visit: Arielle Wang.

## 2022-12-17 PROBLEM — E66.01 SEVERE OBESITY (BMI 35.0-39.9) WITH COMORBIDITY (HCC): Status: ACTIVE | Noted: 2022-12-17

## 2022-12-17 NOTE — PROGRESS NOTES
[address] [place] [address] and Primary Care  [facility]  [address] [facility] [address]  Phone:  [phone]  Fax: [phone]       Chief Complaint   Patient presents with    Follow-up    Cholesterol Problem     Patient here for follow up cholesterol. .      SUBJECTIVE:    Bj Hendricks is a 46 y.o. female comes in for return visit stating that she has done reasonably well. She follows up with her rheumatologist for apparent rheumatoid arthritis. She is on Humira and it does make a difference. Complicating this is the fact that she probably has an element of fibromyalgia which causes chronic pain. She previously had a diagnosis of ADHD and was taking Adderall which she is not taking as has been the case since July. There has been no meaningful weight loss. She wishes to initially take a medication to promote weight reduction. This is one of the reasons she is taking her metformin. This was either started because of that or because of the history of impaired glucose tolerance something that I do not do. Current Outpatient Medications   Medication Sig Dispense Refill    metFORMIN (GLUCOPHAGE) 850 mg tablet TAKE 1 TABLET BY MOUTH TWICE DAILY WITH BREAKFAST AND WITH SUPPER FOR 30 DAYS      adalimumab (Humira,CF, Pen) 40 mg/0.4 mL injection pen 0.4 mL by SubCUTAneous route every fourteen (14) days. Indications: rheumatoid arthritis 1 Kit 0    traZODone (DESYREL) 150 mg tablet Take 1 Tab by mouth nightly.  80 Tab 0     Past Medical History:   Diagnosis Date    Bipolar affect, depressed ([address]     Chronic joint pain     COVID-19 03/2021    GERD (gastroesophageal reflux disease)     PTSD (post-traumatic stress disorder)     RA (rheumatoid arthritis) ([facility] [address]      Past Surgical History:   Procedure Laterality Date    HX CARPAL TUNNEL RELEASE      HX CARPAL TUNNEL RELEASE      both hands    HX CHOLECYSTECTOMY N/A 2004    HX TOTAL ABDOMINAL HYSTERECTOMY N/A 1997     Allergies   Allergen Reactions Methotrexate Other (comments)     Mouth and lip blisters    Percocet [Oxycodone-Acetaminophen] Other (comments)     Gives her the \"creepy crawlies\"          REVIEW OF SYSTEMS:  General: negative for - chills or fever  ENT: negative for - headaches, nasal congestion or tinnitus  Respiratory: negative for - cough, hemoptysis, shortness of breath or wheezing  Cardiovascular : negative for - chest pain, edema, palpitations or shortness of breath  Gastrointestinal: negative for - abdominal pain, blood in stools, heartburn or nausea/vomiting  Genito-Urinary: no dysuria, trouble voiding, or hematuria  Musculoskeletal: negative for - gait disturbance, joint pain, joint stiffness or joint swelling  Neurological: no TIA or stroke symptoms  Hematologic: no bruises, no bleeding, no swollen glands  Integument: no lumps, mole changes, nail changes or rash  Endocrine: no malaise/lethargy or unexpected weight changes      Social History     Socioeconomic History    Marital status: SINGLE   Tobacco Use    Smoking status: Former     Packs/day: 1.00     Years: 35.00     Pack years: 35.00     Types: Cigarettes     Quit date:      Years since quittin.9    Smokeless tobacco: Former   Vaping Use    Vaping Use: Every day    Substances: Flavoring    Devices: Refillable tank   Substance and Sexual Activity    Alcohol use:  Yes     Alcohol/week: 0.0 - 1.0 standard drinks    Drug use: Yes     Types: Marijuana     Comment: medical marijuana license in [facility]/ helps with her hurting so back and panic attacks, needs [facility] one      Social Determinants of Health     Physical Activity: Unknown    Days of Exercise per Week: 0 days     Family History   Problem Relation Age of Onset    Osteoporosis Mother     Arthritis Mother     Dementia Mother     Psychiatric Disorder Mother     Arthritis-rheumatoid Mother     Diabetes Father     Heart Disease Father     Cancer Father     Other Father     Arthritis-rheumatoid Sister     Gout Maternal Grandmother     Arthritis-rheumatoid Paternal Aunt     Lupus Paternal Uncle        OBJECTIVE:    Visit Vitals  /79   Pulse 85   Temp 98.4 °F (36.9 °C) (Oral)   Resp 16   Ht 5' 8\" (1.727 m)   Wt 232 lb 9.6 oz (105.5 kg)   SpO2 99%   BMI 35.37 kg/m²     CONSTITUTIONAL: well , well nourished, appears age appropriate  EYES: perrla, eom intact  ENMT:moist mucous membranes, pharynx clear  NECK: supple. Thyroid normal  RESPIRATORY: Chest: clear to ascultation and percussion   CARDIOVASCULAR: Heart: regular rate and rhythm  GASTROINTESTINAL: Abdomen: soft, bowel sounds active  HEMATOLOGIC: no pathological lymph nodes palpated  MUSCULOSKELETAL: Extremities: no edema, pulse 1+   INTEGUMENT: No unusual rashes or suspicious skin lesions noted. Nails appear normal.  NEUROLOGIC: non-focal exam   MENTAL STATUS: alert and oriented, appropriate affect      ASSESSMENT:  1. Severe obesity (BMI 35.0-39. 9) with comorbidity ([address]    2. Rheumatoid arthritis with positive rheumatoid factor, involving unspecified site ([address]    3. Impaired glucose tolerance    4. Chronic joint pain        PLAN:  1. The patient needs to lose weight. I will refer her to a nutritionist.  2. She will follow up with her rheumatologist regarding her rheumatoid arthritis, as well as her fibromyalgia. 3. She has a history of impaired glucose tolerance which is not surprising given her weight and genetic predisposition. Weight reduction is the only thing that will have an impact. I am not willing to use any type of anorectic. 4. As far as her chronic pain is concerned this is under the auspices of her rheumatologist.          Follow-up and Dispositions    Return if symptoms worsen or fail to improve.            Eric Gómez MD

## 2022-12-23 ENCOUNTER — OFFICE VISIT (OUTPATIENT)
Dept: RHEUMATOLOGY | Age: 52
End: 2022-12-23
Payer: MEDICAID

## 2022-12-23 VITALS
SYSTOLIC BLOOD PRESSURE: 133 MMHG | TEMPERATURE: 98.2 F | RESPIRATION RATE: 16 BRPM | HEART RATE: 80 BPM | HEIGHT: 68 IN | WEIGHT: 234.6 LBS | BODY MASS INDEX: 35.55 KG/M2 | OXYGEN SATURATION: 95 % | DIASTOLIC BLOOD PRESSURE: 91 MMHG

## 2022-12-23 DIAGNOSIS — Z79.899 ONGOING USE OF POSSIBLY TOXIC MEDICATION: ICD-10-CM

## 2022-12-23 DIAGNOSIS — M47.819 PERIPHERAL SPONDYLOARTHRITIS: Primary | ICD-10-CM

## 2022-12-23 DIAGNOSIS — M79.7 FIBROMYALGIA: ICD-10-CM

## 2022-12-23 DIAGNOSIS — M47.816 LUMBAR FACET ARTHROPATHY: ICD-10-CM

## 2022-12-23 DIAGNOSIS — M06.00 SERONEGATIVE RHEUMATOID ARTHRITIS (HCC): ICD-10-CM

## 2022-12-23 DIAGNOSIS — M54.32 SCIATICA, LEFT SIDE: ICD-10-CM

## 2022-12-23 RX ORDER — ACETAMINOPHEN 325 MG/1
TABLET ORAL
COMMUNITY

## 2022-12-23 RX ORDER — CELECOXIB 200 MG/1
200 CAPSULE ORAL
Qty: 60 CAPSULE | Refills: 2 | Status: SHIPPED | OUTPATIENT
Start: 2022-12-23 | End: 2023-03-23

## 2022-12-23 RX ORDER — IBUPROFEN 200 MG
200 TABLET ORAL
COMMUNITY
End: 2022-12-23

## 2022-12-23 RX ORDER — ADALIMUMAB 40MG/0.4ML
40 KIT SUBCUTANEOUS
Qty: 1.6 ML | Refills: 5 | Status: SHIPPED | OUTPATIENT
Start: 2022-12-23

## 2022-12-23 NOTE — PROGRESS NOTES
REASON FOR VISIT:   Rohit Eli is a 46 y.o. female with history of depression, PTSD, non-crystal proven gout, and hypertension who is returning for followup of HLA-B27+ peripheral spondyloarthritis. HISTORY OF PRESENT ILLNESS    Pt returns for a follow up to discuss chronic pain management and the results of her MRI. Last visit 12/5/2022. Continues to feel global pain, body is in a \"constant ache. \" Interferes with sleep. Pain is constant, notices it all day long. Legs ache. Hands hurt with activity, was having a hard time coloring with her children yesterday. Has upcoming appointment on 1/5 for possible repeat shots in the thumbs. Feels better after each Humira injection, but relief only lasts for 5 days. Takes ibuprofen 800mg 4x/day. 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 Any activity will irritate muscles and joints stressed. Asked her insurance about Pain Management providers in the area, but was directed to multiple providers who declined her insurance after all. Pt is looking into transitioning care to new PCP after disappointment with last PCP visit, Dr. David Godfrey declined prescription treatment or referral for weight management, and has referred her back to us for management of fibromyalgia. REVIEW OF SYSTEMS  A comprehensive review of systems was negative except for that written in the HPI. A 10-point review of systems is per the new patient questionnaire, which has been reviewed extensively and scanned into the electronic medical record for future reference. Review of systems is as above and is otherwise negative. ALLERGIES  Methotrexate and Percocet [oxycodone-acetaminophen]    MEDICATIONS  Current Outpatient Medications   Medication Sig    ibuprofen (MOTRIN) 200 mg tablet Take 200 mg by mouth.     acetaminophen (TylenoL) 325 mg tablet Take  by mouth every four (4) hours as needed for Pain.    metFORMIN (GLUCOPHAGE) 850 mg tablet TAKE 1 TABLET BY MOUTH TWICE DAILY WITH BREAKFAST AND WITH SUPPER FOR 30 DAYS    adalimumab (Humira,CF, Pen) 40 mg/0.4 mL injection pen 0.4 mL by SubCUTAneous route every fourteen (14) days. Indications: rheumatoid arthritis    traZODone (DESYREL) 150 mg tablet Take 1 Tab by mouth nightly. No current facility-administered medications for this visit. PAST MEDICAL HISTORY  Past Medical History:   Diagnosis Date    Bipolar affect, depressed ([facility] [address]     Chronic joint pain     COVID-19 03/2021    GERD (gastroesophageal reflux disease)     PTSD (post-traumatic stress disorder)     RA (rheumatoid arthritis) ([facility])        FAMILY HISTORY  family history includes Arthritis in her mother; Arthritis-rheumatoid in her mother, paternal aunt, and sister; Cancer in her father; Dementia in her mother; Diabetes in her father; Gout in her maternal grandmother; Heart Disease in her father; Lupus in her paternal uncle; Osteoporosis in her mother; Other in her father; Psychiatric Disorder in her mother. SOCIAL HISTORY  She  reports that she quit smoking about 2 years ago. Her smoking use included cigarettes. She has a 35.00 pack-year smoking history. She has quit using smokeless tobacco. She reports current alcohol use. She reports current drug use. Drug: Marijuana. 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 Born and raised in [place]. Son lives in [place]. Drinks a glass of wine once or twice a month. Quit smoking after 1/2 PPD x35yr.        DATA  Visit Vitals  BP (!) 133/91 (BP 1 Location: Left arm, BP Patient Position: Sitting, BP Cuff Size: Adult)   Pulse 80   Temp 98.2 °F (36.8 °C) (Oral)   Resp 16   Ht 5' 8\" (1.727 m)   Wt 234 lb 9.6 oz (106.4 kg)   SpO2 95%   BMI 35.67 kg/m²       mHAQ 1.25  Patient pain 70/100  Patient global 70/100  MD global: 3/10  General:  The patient is pleasant, obese, well nourished, alert, frustrated-appearing, but in no acute distress. Eyes: Sclera are anicteric. No conjunctival injection. HEENT:  Oropharynx is clear. No oral ulcers. Adequate salivary pooling. No cervical or supraclavicular lymphadenopathy. Lungs:  Clear to auscultation bilaterally, without wheeze or stridor. Normal respiratory effort. Cor:  Regular rate and rhythm. No murmurs rubs or gallops. Abdomen: Sities: No calf tenderness or edema. Warm and well perfused. Skin:  No significant abnormalitiesoft, non-tender, without hepatomegaly or masses. Extrem. No petechial, purpuric, or psoriaform rashes. Normal nails. No sclerodactyly. Neuro: No focal muscle wasting of hands. No foot or wrist drop. Negative SLR bilaterally. Musculoskeletal:    A comprehensive musculoskeletal exam was performed for all joints of each upper and lower extremity and assessed for swelling, tenderness and range of motion. Results are documented as below:  Pan-positive FMTP  Intact ROM in wrist with persistent bilateral wrist joint line tenderness. Postsurgical soft tissue contracture in bilateral distal volar forearms. Bilateral CMC tenderness. Negative MTP squeeze tenderness  Unchanged bilateral SI direct tenderness, paralumbar tenderness  Stable left trochanteric tenderness. No evidence of synovitis in the shoulders, elbows, hips, knees or ankles.    mShober 15->20cm previously, not repeated today      Labs:   11/15/22: HGB A1c 5.9, CBC WNL, Apolipoprotein B 106, CRP 8.9 mg/L, Cholesterol 230, Triglyceride 158, .4, Cr 0.83, LFT WNL, TSH 0.39, UA WNL,   8/23/22: Adalimumab level 3.4, ESR 8, Cr 0.67, LFT WNL, CBC WNL, CRP 6 mg/L  1/4/22: Vit D 27.4, B12 575   12/30/21: CBC WNL; ESR 29; Cr 0.76, LFTs WNL, CRP 9mg/L  1/4/21 B12 575; Vit D 27  12/30/21 CBC WNL; ESR 29; Cr 0.76, LFTs WNL; CRP 9mg/L  3/4/21: CMP WNL, ESR 18, CRP 10mg/L  12/17/20: ESR 19, CMP WNL (Cr 098), CBC WNL, uric acid 7.0  7/8/20: Uric acid 7.5, CCP negative, RF negative, B12 183, SHERON negative, HLA-B27+    Imagin/13/22: MR pelvis without:  1. Normal sacroiliac joints. No sacroiliitis. 2. Moderate left hamstring origin tendinosis with reactive bone marrow edema in the left ischium. 22 XR SPINE LUMB 2 or 3 V: Lower lumbar degenerative disease. No findings of ankylosing spondylitis. 22 XR BIPS BI W OR WO AP PELV: No acute abnormality    22 MR L wrist:  FINDINGS: Bone marrow: No acute fracture, dislocation, or marrow replacing  process. Subtle cystic changes/ganglion formation in the capitate and lunate  Articular cartilage and fluid: High-grade to full-thickness chondral loss,  scattered prominent subchondral cystic changes/marrow edema, bony productive  change, and joint effusion with synovitis at the thumb carpometacarpal  articulation. Scattered subchondral cystic changes and marrow edema at the triscaphe articulation. Chondral signal heterogeneity and high-grade thinning of the radiocarpal and intercarpal articulations. Small distal radioulnar joint effusion with synovitis. No discrete osseous erosions identified. Triangular fibrocartilage complex: Focal thinning of the disc (5-14), likely degenerative. Sprain versus degeneration at the foveal and styloid attachments. Lunotriquetral and scapholunate ligaments: appear intact on this study. Extrinsic ligaments: Sprain versus degeneration of the dorsal capsule ligaments. Extensor tendons: Marked tendinosis of the ECU tendon at the level of the ulnar  styloid. Moderate tendinosis with possible intrasubstance tear of the first  extensor compartment tendons. Flexor tendons: Intact. Muscles: Within normal limits. Carpal tunnel and Guyon's canal: Thickening of the median nerve and palmar  bowing of the flexor retinaculum, compatible with carpal tunnel syndrome. Subtle  increased signal of the ulnar nerve, correlate for associated neuropathy. Soft tissue mass:  An approximately 16 mm x 9 mm x 12 mm lobulated multiloculated ncomplex ganglion arising from the volar radial aspect of the radiocarpal articulation (8-18, 4-18)  IMPRESSION  1. Moderate to marked chondrosis and osteoarthritis involving the visualized wrist, most pronounced at the thumb carpometacarpal articulation. No discrete osseous erosions to suggest inflammatory arthropathy. 2.  Marked extensor carpi ulnaris tendinosis. Moderate tendinosis with possible intrasubstance tear of the first extensor compartment tendons. 3.  Degenerative thinning of the disc and sprain versus degeneration at the foveal and styloid attachments of the triangular fibrocartilage complex. 4.  Thickening of the median nerve and palmar bowing of the flexor retinaculum, compatible with carpal tunnel syndrome. Subtle increased signal of the ulnar nerve, correlate for associated neuropathy. 5.  Lobulated multiloculated complex ganglion at the volar radial aspect of the radiocarpal articulation. 3/4/21 Lspine xray: Prominent chronic disc degeneration L5-S1 with some posterior element hypertrophy. 3/4/21 XR hands, feet, knees, pelvis: No bony erosions, periarticular osteopenia, or chondrocalcinosis. Liborio Johnston  Ms. Lachelle Vilchis is a 46 y.o. female who presents for evaluation of HLA-B27+ peripheral spondyloarthropathy, with partial improvement on Humira but persistent enthesopathy. Increasing Humira to weekly for overlapping seronegative RA and SpA, given still moderate disease activity by [facility]. She does also have overlapping fibromyalgia given prominent pain sensitization, fatigue, and associated debility. Transitioning to Celebrex 200mg twice a day as needed in place of unsustainably high doses of ibuprofen she is taking OTC.  We unfortunately do not have the bandwidth to manage pain sensitization and noninflammatory conditions from this clinic, but we reviewed denny chi and will continue to do what we can to maximize her comfort until she can establish with Pain Management. 1. Peripheral spondyloarthritis  - celecoxib (CELEBREX) 200 mg capsule; Take 1 Capsule by mouth two (2) times daily as needed for Pain for up to 90 days. Dispense: 60 Capsule; Refill: 2  - adalimumab (Humira,CF, Pen) 40 mg/0.4 mL injection pen; 0.4 mL by SubCUTAneous route every seven (7) days. Indications: rheumatoid arthritis  Dispense: 1.6 mL; Refill: 5  - C REACTIVE PROTEIN, QT; Future  - CBC WITH AUTOMATED DIFF; Future  - METABOLIC PANEL, COMPREHENSIVE; Future  - SED RATE (ESR); Future  - ADALIMUMAB+AB; Future  - QUANTIFERON-TB GOLD PLUS; Future    2. Sciatica, left side  - celecoxib (CELEBREX) 200 mg capsule; Take 1 Capsule by mouth two (2) times daily as needed for Pain for up to 90 days. Dispense: 60 Capsule; Refill: 2    3. Fibromyalgia  - celecoxib (CELEBREX) 200 mg capsule; Take 1 Capsule by mouth two (2) times daily as needed for Pain for up to 90 days. Dispense: 60 Capsule; Refill: 2  -Fibromyalgia is a disease characterized by chronic widespread musculoskeletal pain. Fibromyalgia is caused by abnormal processing of pain signals in the central nervous system, leading to exaggerated pain responses. Non-pharmacologic therapies such as cardiovascular exercise and Cognitive Behavioral Therapy have been shown to be of benefit ([facility], Am J Med 2009). Marlon Chi in particular has proven efficacy in the treatment of fibromyalgia LONNIE Gaines 2010). If pharmacotherapy is pursued, pregabalin (Lyrica), gabapentin (Neurontin), milnacipran (Odella Camera), and duloxetine (Cymbalta) are FDA approved medications for the treatment of fibromyalgia. Narcotics have not been proven to be efficacious in the treatment of fibromyalgia. In fact, narcotic use in this patient population has been observed to exacerbate depression, and may enhance the hyperalgesia which is characteristic of this condition (Komal Lash Rheum 2006).  They also are at increased risk for opioid-induced hyperalgesia due predominantly to central sensitization Vanessa Carlton al. J Clin Rheumatol. 2013 Mar;19(2):72-7). Specifically, a double-blind placebo-controlled trial by Gabi Arce al published in 1995 demonstrated that intravenous morphine did not reduce pain in fibromyalgia patients. A study by Marky richardson published in 2003 showed that fibromyalgia patients taking oral opiates did not experience improvement in their pain at four years of follow up, and also reported increased depression over the last two years of the study. There is subsequent concern that the prolonged use of narcotics to treat fibromyalgia may cause harm to these patients Alex Montalvo, Pain 2005). Opioid use in fibromyalgia had poorer symptoms and functional and occupational status compared to nonusers (Aleksander KILLIAN et al. Pain Res Treat. 8718;3733:074820). We therefore recommend that narcotics be avoided in all patients with fibromyalgia. Furthermore, naltrexone 4.5mg daily has been shown to improve daily pain scores in fibromyalgia in a randomized, controlled clinical trial (Arthritis Rheum. 2013 Feb;65(2):529-38); this can be prescribed through a compounding pharmacy and is a consideration for patients not already dependent on opiate-type medications. For all patients, we recommend Marlon Chi stretching exercises for at least 30 minutes per day. The Arth[employer] Foundation has made a videotape of [address] that she can borrow from [employer], purchase online or watch for free on [employer]. [employer] Marlon Chi for Arthritis. We discussed treating secondary causes, such as sleep apnea, poor sleep quality, depression, anxiety, weight loss, vitamin deficiencies, such as vitamin D, and pursuing aquatherapy. I encouraged her to do [address]. My recommendations were provided to her and she was informed to follow up with her primary care physician for further management of her fibromyalgia. 4. Lumbar facet arthropathy  - celecoxib (CELEBREX) 200 mg capsule;  Take 1 Capsule by mouth two (2) times daily as needed for Pain for up to 90 days. Dispense: 60 Capsule; Refill: 2    5. Seronegative rheumatoid arthritis (HCC)  - adalimumab (Humira,CF, Pen) 40 mg/0.4 mL injection pen; 0.4 mL by SubCUTAneous route every seven (7) days. Indications: rheumatoid arthritis  Dispense: 1.6 mL; Refill: 5    6. Ongoing use of possibly toxic medication  - CBC WITH AUTOMATED DIFF; Future  - METABOLIC PANEL, COMPREHENSIVE; Future  - ADALIMUMAB+AB; Future  - QUANTIFERON-TB GOLD PLUS; Future      Patient Instructions   I'm applying for Humira to increase to once every 7 days. Continue every other week injections until you hear from the pharmacy that this is approved. I'm prescribing Celebrex to take up to twice a day as needed. Don't take ibuprofen any more while you're taking this. You can take Tylenol though if you need breakthrough pain management, up to 1000mg 3x/day as needed. Continue to look for Pain providers to help with further adjunctive treatments--try reaching out to [facility], phone is [phone] (they [place] located [place] Call [phone] to see if you can transfer care to Dr. Belgica Manzo. Repeat labs in 6 weeks  Return in 2 months. Orders Placed This Encounter    QUANTIFERON-TB GOLD PLUS    C REACTIVE PROTEIN, QT    CBC WITH AUTOMATED DIFF    METABOLIC PANEL, COMPREHENSIVE    SED RATE (ESR)    ADALIMUMAB+AB    DISCONTD: ibuprofen (MOTRIN) 200 mg tablet    acetaminophen (TylenoL) 325 mg tablet    celecoxib (CELEBREX) 200 mg capsule    adalimumab (Humira,CF, Pen) 40 mg/0.4 mL injection pen       Medications: I have changed Laura Flanagan's Humira(CF) Pen. I am also having her start on celecoxib. Additionally, I am having her maintain her traZODone, metFORMIN, and acetaminophen. Follow up: Return in about 2 months (around 2/23/2023).      Face to face time: 35 minutes  Note preparation and records review day of service: 15 minutes  Total provider time day of service: 48 minutes    Emerita Summers MD    Adult Rheumatology   General acute hospital  A Part of [facility], [address]   Phone [phone]  Fax [phone]

## 2022-12-23 NOTE — PATIENT INSTRUCTIONS
I'm applying for Humira to increase to once every 7 days. Continue every other week injections until you hear from the pharmacy that this is approved. I'm prescribing Celebrex to take up to twice a day as needed. Don't take ibuprofen any more while you're taking this. You can take Tylenol though if you need breakthrough pain management, up to 1000mg 3x/day as needed. Continue to look for Pain providers to help with further adjunctive treatments--try reaching out to [facility], phone is [phone] (they are located [place] Call [phone] to see if you can transfer care to Dr. Soren Potter. Repeat labs in 6 weeks  Return in 2 months.

## 2022-12-23 NOTE — PROGRESS NOTES
Verified Name and  of the patient. Chief Complaint   Patient presents with    Follow-up     3 week follow-up Peripheral spondyloarthritis. Health maintenance will be addressed with Primary Care Provider at next visit. Vitals:    22 0919   BP: (!) 133/91   Pulse: 80   Resp: 16   Temp: 98.2 °F (36.8 °C)   TempSrc: Oral   SpO2: 95%   Weight: 234 lb 9.6 oz (106.4 kg)   Height: 5' 8\" (1.727 m)   PainSc:   7   PainLoc: Generalized     3 most recent PHQ Screens 2022   PHQ Not Done -   Little interest or pleasure in doing things More than half the days   Feeling down, depressed, irritable, or hopeless More than half the days   Total Score PHQ 2 4   Trouble falling or staying asleep, or sleeping too much More than half the days   Feeling tired or having little energy More than half the days   Poor appetite, weight loss, or overeating Not at all   Feeling bad about yourself - or that you are a failure or have let yourself or your family down Not at all   Trouble concentrating on things such as school, work, reading, or watching TV Nearly every day   Moving or speaking so slowly that other people could have noticed; or the opposite being so fidgety that others notice More than half the days   Thoughts of being better off dead, or hurting yourself in some way Not at all   PHQ 9 Score 13   How difficult have these problems made it for you to do your work, take care of your home and get along with others Very difficult       1. Have you been to the ER, urgent care clinic since your last visit? Hospitalized since your last visit? No    2. Have you seen or consulted any other health care providers outside of the [address] [place] [address] since your last visit? Include any pap smears or colon screening.  No

## 2022-12-29 NOTE — PROGRESS NOTES
[facility]  Dispensing Pharmacy: [address] [place] [address]    Anticipated Ship Date: 12/28/2022    Financial Assistance: No    Patient Copay: $0    Additional Notes: We have called the patient to confirm this info. Please call us with any questions at [phone].

## 2023-01-01 NOTE — PROGRESS NOTES
[address] [place] [address] and Primary Care  [facility]  [address] [place] [id number]  Phone:  [phone]  Fax: [phone]       Chief Complaint   Patient presents with    Establish Care     Patient here to Establish Care/Annual Wellness exam.     Annual Wellness Visit   . SUBJECTIVE:    Jackie Brice is a 46 y.o. female comes in for physical examination. She was concerned about weight loss and the need to do so. Her BMI is 35.67. We talked about this at length. She also complains of fatigue without other associated symptoms. She has a history of rheumatoid arthritis and sees Dr. Bhavin Ocasio where she is getting bi-weekly injections of Humira. Finally, she does have a history of diabetes and is currently taking metformin 850 mg b.i.d. At this point, I am not entirely sure of the connection. Current Outpatient Medications   Medication Sig Dispense Refill    metFORMIN (GLUCOPHAGE) 850 mg tablet TAKE 1 TABLET BY MOUTH TWICE DAILY WITH BREAKFAST AND WITH SUPPER FOR 30 DAYS      traZODone (DESYREL) 150 mg tablet Take 1 Tab by mouth nightly. 90 Tab 0    acetaminophen (TylenoL) 325 mg tablet Take  by mouth every four (4) hours as needed for Pain. celecoxib (CELEBREX) 200 mg capsule Take 1 Capsule by mouth two (2) times daily as needed for Pain for up to 90 days. 60 Capsule 2    adalimumab (Humira,CF, Pen) 40 mg/0.4 mL injection pen 0.4 mL by SubCUTAneous route every seven (7) days.  Indications: rheumatoid arthritis 1.6 mL 5     Past Medical History:   Diagnosis Date    Bipolar affect, depressed ([address]     Chronic joint pain     COVID-19 03/2021    GERD (gastroesophageal reflux disease)     PTSD (post-traumatic stress disorder)     RA (rheumatoid arthritis) ([address]      Past Surgical History:   Procedure Laterality Date    HX CARPAL TUNNEL RELEASE      HX CARPAL TUNNEL RELEASE      both hands    HX CHOLECYSTECTOMY N/A 2004    HX TOTAL ABDOMINAL HYSTERECTOMY N/A 1997     Allergies Allergen Reactions    Methotrexate Other (comments)     Mouth and lip blisters    Percocet [Oxycodone-Acetaminophen] Other (comments)     Gives her the \"creepy crawlies\"          REVIEW OF SYSTEMS:  General: negative for - chills or fever  ENT: negative for - headaches, nasal congestion or tinnitus  Respiratory: negative for - cough, hemoptysis, shortness of breath or wheezing  Cardiovascular : negative for - chest pain, edema, palpitations or shortness of breath  Gastrointestinal: negative for - abdominal pain, blood in stools, heartburn or nausea/vomiting  Genito-Urinary: no dysuria, trouble voiding, or hematuria  Musculoskeletal: negative for - gait disturbance, joint pain, joint stiffness or joint swelling  Neurological: no TIA or stroke symptoms  Hematologic: no bruises, no bleeding, no swollen glands  Integument: no lumps, mole changes, nail changes or rash  Endocrine: no malaise/lethargy or unexpected weight changes      Social History     Socioeconomic History    Marital status: SINGLE   Tobacco Use    Smoking status: Former     Packs/day: 1.00     Years: 35.00     Pack years: 35.00     Types: Cigarettes     Quit date: 2020     Years since quitting: 3.0    Smokeless tobacco: Former   Vaping Use    Vaping Use: Every day    Substances: Flavoring    Devices: Refillable tank   Substance and Sexual Activity    Alcohol use:  Yes     Alcohol/week: 0.0 - 1.0 standard drinks    Drug use: Yes     Types: Marijuana     Comment: medical marijuana license in [facility]/ helps with her hurting so back and panic attacks, needs [facility]      Social Determinants of Health     Physical Activity: Unknown    Days of Exercise per Week: 0 days     Family History   Problem Relation Age of Onset    Osteoporosis Mother     Arthritis Mother     Dementia Mother     Psychiatric Disorder Mother     Arthritis-rheumatoid Mother     Diabetes Father     Heart Disease Father     Cancer Father     Other Father     Arthritis-rheumatoid Sister Gout Maternal Grandmother     Arthritis-rheumatoid Paternal Aunt     Lupus Paternal Uncle        OBJECTIVE:    Visit Vitals  /80   Pulse 89   Temp 98.3 °F (36.8 °C) (Oral)   Resp 16   Ht 5' 7\" (1.702 m)   Wt 229 lb 11.2 oz (104.2 kg)   SpO2 100%   BMI 35.98 kg/m²     CONSTITUTIONAL: well , well nourished, appears age appropriate  EYES: perrla, eom intact  ENMT:moist mucous membranes, pharynx clear  NECK: supple. Thyroid normal  RESPIRATORY: Chest: clear to ascultation and percussion   CARDIOVASCULAR: Heart: regular rate and rhythm  GASTROINTESTINAL: Abdomen: soft, bowel sounds active  HEMATOLOGIC: no pathological lymph nodes palpated  MUSCULOSKELETAL: Extremities: no edema, pulse 1+   INTEGUMENT: No unusual rashes or suspicious skin lesions noted. Nails appear normal.  NEUROLOGIC: non-focal exam   MENTAL STATUS: alert and oriented, appropriate affect      ASSESSMENT:  1. Severe obesity (BMI 35.0-39. 9) with comorbidity ([address]    2. Impaired glucose tolerance    3. Rheumatoid arthritis with positive rheumatoid factor, involving unspecified site ([address]    4. Bipolar affective disorder, currently depressed, mild ([address]    5. Dyslipidemia    6. Physical exam        PLAN:  1. The patient does need to lose weight. I suggested to her that she eat meals, eliminate snacks, and avoid the consumption of processed carbohydrates. 2. She apparently has a history of impaired glucose tolerance not overt diabetes and was placed on metformin from her previous physician. I explained to her that weight reduction is the most important thing not a preoccupation with blood sugars particularly at this early stage. 3. She will follow up with her rheumatologist regarding her rheumatoid arthritis. 4. She does have a history of bipolar disorder and needs to affiliate with a therapist.  5. She has an increased cardiovascular risk and probably needs to be on a statin.       Orders Placed This Encounter    HEMOGLOBIN A1C WITH EAG APOLIPOPROTEIN B    CBC WITH AUTOMATED DIFF    CRP, HIGH SENSITIVITY    LIPID PANEL    METABOLIC PANEL, COMPREHENSIVE    TSH 3RD GENERATION    URINALYSIS W/ RFLX MICROSCOPIC    5-DRUG SCREEN, UR    metFORMIN (GLUCOPHAGE) 850 mg tablet         Follow-up and Dispositions    Return in about 4 weeks (around 12/13/2022).            Janine Hernandez MD

## 2024-06-11 ENCOUNTER — APPOINTMENT (RX ONLY)
Dept: URBAN - NONMETROPOLITAN AREA CLINIC 40 | Facility: CLINIC | Age: 54
Setting detail: DERMATOLOGY
End: 2024-06-11

## 2024-06-11 DIAGNOSIS — L73.2 HIDRADENITIS SUPPURATIVA: ICD-10-CM

## 2024-06-11 DIAGNOSIS — L57.8 OTHER SKIN CHANGES DUE TO CHRONIC EXPOSURE TO NONIONIZING RADIATION: ICD-10-CM

## 2024-06-11 DIAGNOSIS — Z71.89 OTHER SPECIFIED COUNSELING: ICD-10-CM

## 2024-06-11 PROCEDURE — ? ORDER TESTS

## 2024-06-11 PROCEDURE — ? PRESCRIPTION

## 2024-06-11 PROCEDURE — ? MEDICATION COUNSELING

## 2024-06-11 PROCEDURE — 99204 OFFICE O/P NEW MOD 45 MIN: CPT

## 2024-06-11 PROCEDURE — ? COUNSELING

## 2024-06-11 RX ORDER — ADALIMUMAB 40MG/0.8ML
KIT SUBCUTANEOUS
Qty: 8 | Refills: 0 | Status: ERX | COMMUNITY
Start: 2024-06-11

## 2024-06-11 RX ORDER — BENZOYL PEROXIDE 100 MG/G
LOTION TOPICAL
Qty: 237 | Refills: 2 | Status: ERX | COMMUNITY
Start: 2024-06-11

## 2024-06-11 RX ORDER — CLINDAMYCIN PHOSPHATE 10 MG/ML
LOTION TOPICAL QD
Qty: 60 | Refills: 2 | Status: ERX | COMMUNITY
Start: 2024-06-11

## 2024-06-11 RX ORDER — ADALIMUMAB 40MG/0.8ML
KIT SUBCUTANEOUS
Qty: 2 | Refills: 11 | Status: ERX

## 2024-06-11 RX ORDER — DOXYCYCLINE 50 MG/1
CAPSULE ORAL
Qty: 60 | Refills: 2 | Status: ERX | COMMUNITY
Start: 2024-06-11

## 2024-06-11 RX ADMIN — CLINDAMYCIN PHOSPHATE: 10 LOTION TOPICAL at 00:00

## 2024-06-11 RX ADMIN — DOXYCYCLINE: 50 CAPSULE ORAL at 00:00

## 2024-06-11 RX ADMIN — BENZOYL PEROXIDE: 100 LOTION TOPICAL at 00:00

## 2024-06-11 RX ADMIN — ADALIMUMAB: KIT at 00:00

## 2024-06-11 ASSESSMENT — LOCATION DETAILED DESCRIPTION DERM
LOCATION DETAILED: LEFT INGUINAL FOLD
LOCATION DETAILED: LEFT ANTERIOR SHOULDER
LOCATION DETAILED: LEFT PROXIMAL DORSAL FOREARM
LOCATION DETAILED: RIGHT PROXIMAL DORSAL FOREARM

## 2024-06-11 ASSESSMENT — LOCATION SIMPLE DESCRIPTION DERM
LOCATION SIMPLE: LEFT FOREARM
LOCATION SIMPLE: RIGHT FOREARM
LOCATION SIMPLE: LEFT INGUINAL FOLD
LOCATION SIMPLE: LEFT SHOULDER

## 2024-06-11 ASSESSMENT — LOCATION ZONE DERM
LOCATION ZONE: LEG
LOCATION ZONE: ARM

## 2024-06-11 NOTE — PROCEDURE: ORDER TESTS
Expected Date Of Service: 06/11/2024
Performing Laboratory: 0
Billing Type: Third-Party Bill
Bill For Surgical Tray: no

## 2024-06-11 NOTE — PROCEDURE: MEDICATION COUNSELING
Calcipotriene Counseling:  I discussed with the patient the risks of calcipotriene including but not limited to erythema, scaling, itching, and irritation.
High Dose Vitamin A Counseling: Side effects reviewed, pt to contact office should one occur.
Topical Sulfur Applications Pregnancy And Lactation Text: This medication is considered safe during pregnancy and breast feeding secondary to limited systemic absorption.
Gabapentin Counseling: I discussed with the patient the risks of gabapentin including but not limited to dizziness, somnolence, fatigue and ataxia.
Humira Pregnancy And Lactation Text: This medication is Pregnancy Category B and is considered safe during pregnancy. It is unknown if this medication is excreted in breast milk.
Sotyktu Pregnancy And Lactation Text: There is insufficient data to evaluate whether or not Sotyktu is safe to use during pregnancy.? ?It is not known if Sotyktu passes into breast milk and whether or not it is safe to use when breastfeeding.??
Ketoconazole Pregnancy And Lactation Text: This medication is Pregnancy Category C and it isn't know if it is safe during pregnancy. It is also excreted in breast milk and breast feeding isn't recommended.
Finasteride Pregnancy And Lactation Text: This medication is absolutely contraindicated during pregnancy. It is unknown if it is excreted in breast milk.
Rhofade Counseling: Rhofade is a topical medication which can decrease superficial blood flow where applied. Side effects are uncommon and include stinging, redness and allergic reactions.
Clofazimine Counseling:  I discussed with the patient the risks of clofazimine including but not limited to skin and eye pigmentation, liver damage, nausea/vomiting, gastrointestinal bleeding and allergy.
Carac Counseling:  I discussed with the patient the risks of Carac including but not limited to erythema, scaling, itching, weeping, crusting, and pain.
Mirvaso Pregnancy And Lactation Text: This medication has not been assigned a Pregnancy Risk Category. It is unknown if the medication is excreted in breast milk.
Niacinamide Pregnancy And Lactation Text: These medications are considered safe during pregnancy.
Oxybutynin Counseling:  I discussed with the patient the risks of oxybutynin including but not limited to skin rash, drowsiness, dry mouth, difficulty urinating, and blurred vision.
Cyclophosphamide Counseling:  I discussed with the patient the risks of cyclophosphamide including but not limited to hair loss, hormonal abnormalities, decreased fertility, abdominal pain, diarrhea, nausea and vomiting, bone marrow suppression and infection. The patient understands that monitoring is required while taking this medication.
Cibinqo Pregnancy And Lactation Text: It is unknown if this medication will adversely affect pregnancy or breast feeding.  You should not take this medication if you are currently pregnant or planning a pregnancy or while breastfeeding.
Zoryve Counseling:  I discussed with the patient that Zoryve is not for use in the eyes, mouth or vagina. The most commonly reported side effects include diarrhea, headache, insomnia, application site pain, upper respiratory tract infections, and urinary tract infections.  All of the patient's questions and concerns were addressed.
Hydroquinone Counseling:  Patient advised that medication may result in skin irritation, lightening (hypopigmentation), dryness, and burning.  In the event of skin irritation, the patient was advised to reduce the amount of the drug applied or use it less frequently.  Rarely, spots that are treated with hydroquinone can become darker (pseudoochronosis).  Should this occur, patient instructed to stop medication and call the office. The patient verbalized understanding of the proper use and possible adverse effects of hydroquinone.  All of the patient's questions and concerns were addressed.
Siliq Pregnancy And Lactation Text: The risk during pregnancy and breastfeeding is uncertain with this medication.
Albendazole Counseling:  I discussed with the patient the risks of albendazole including but not limited to cytopenia, kidney damage, nausea/vomiting and severe allergy.  The patient understands that this medication is being used in an off-label manner.
Calcipotriene Pregnancy And Lactation Text: The use of this medication during pregnancy or lactation is not recommended as there is insufficient data.
Erythromycin Pregnancy And Lactation Text: This medication is Pregnancy Category B and is considered safe during pregnancy. It is also excreted in breast milk.
Cimzia Counseling:  I discussed with the patient the risks of Cimzia including but not limited to immunosuppression, allergic reactions and infections.  The patient understands that monitoring is required including a PPD at baseline and must alert us or the primary physician if symptoms of infection or other concerning signs are noted.
Topical Clindamycin Counseling: Patient counseled that this medication may cause skin irritation or allergic reactions.  In the event of skin irritation, the patient was advised to reduce the amount of the drug applied or use it less frequently.   The patient verbalized understanding of the proper use and possible adverse effects of clindamycin.  All of the patient's questions and concerns were addressed.
Xeljanz Counseling: I discussed with the patient the risks of Xeljanz therapy including increased risk of infection, liver issues, headache, diarrhea, or cold symptoms. Live vaccines should be avoided. They were instructed to call if they have any problems.
Tetracycline Pregnancy And Lactation Text: This medication is Pregnancy Category D and not consider safe during pregnancy. It is also excreted in breast milk.
Libtayo Pregnancy And Lactation Text: This medication is contraindicated in pregnancy and when breast feeding.
Bactrim Counseling:  I discussed with the patient the risks of sulfa antibiotics including but not limited to GI upset, allergic reaction, drug rash, diarrhea, dizziness, photosensitivity, and yeast infections.  Rarely, more serious reactions can occur including but not limited to aplastic anemia, agranulocytosis, methemoglobinemia, blood dyscrasias, liver or kidney failure, lung infiltrates or desquamative/blistering drug rashes.
Cantharidin Counseling:  I discussed with the patient the risks of Cantharidin including but not limited to pain, redness, burning, itching, and blistering.
Birth Control Pills Counseling: Birth Control Pill Counseling: I discussed with the patient the potential side effects of OCPs including but not limited to increased risk of stroke, heart attack, thrombophlebitis, deep venous thrombosis, hepatic adenomas, breast changes, GI upset, headaches, and depression.  The patient verbalized understanding of the proper use and possible adverse effects of OCPs. All of the patient's questions and concerns were addressed.
Oxybutynin Pregnancy And Lactation Text: This medication is Pregnancy Category B and is considered safe during pregnancy. It is unknown if it is excreted in breast milk.
Zoryve Pregnancy And Lactation Text: It is unknown if this medication can cause problems during pregnancy and breastfeeding.
Litfulo Counseling: I discussed with the patient the risks of Litfulo therapy including but not limited to upper respiratory tract infections, shingles, cold sores, and nausea. Live vaccines should be avoided.  This medication has been linked to serious infections; higher rate of mortality; malignancy and lymphoproliferative disorders; major adverse cardiovascular events; thrombosis; gastrointestinal perforations; neutropenia; lymphopenia; anemia; liver enzyme elevations; and lipid elevations.
Hyrimoz Counseling:  I discussed with the patient the risks of adalimumab including but not limited to myelosuppression, immunosuppression, autoimmune hepatitis, demyelinating diseases, lymphoma, and serious infections.  The patient understands that monitoring is required including a PPD at baseline and must alert us or the primary physician if symptoms of infection or other concerning signs are noted.
Terbinafine Counseling: Patient counseling regarding adverse effects of terbinafine including but not limited to headache, diarrhea, rash, upset stomach, liver function test abnormalities, itching, taste/smell disturbance, nausea, abdominal pain, and flatulence.  There is a rare possibility of liver failure that can occur when taking terbinafine.  The patient understands that a baseline LFT and kidney function test may be required. The patient verbalized understanding of the proper use and possible adverse effects of terbinafine.  All of the patient's questions and concerns were addressed.
Wartpeel Counseling:  I discussed with the patient the risks of Wartpeel including but not limited to erythema, scaling, itching, weeping, crusting, and pain.
Cyclophosphamide Pregnancy And Lactation Text: This medication is Pregnancy Category D and it isn't considered safe during pregnancy. This medication is excreted in breast milk.
Minocycline Counseling: Patient advised regarding possible photosensitivity and discoloration of the teeth, skin, lips, tongue and gums.  Patient instructed to avoid sunlight, if possible.  When exposed to sunlight, patients should wear protective clothing, sunglasses, and sunscreen.  The patient was instructed to call the office immediately if the following severe adverse effects occur:  hearing changes, easy bruising/bleeding, severe headache, or vision changes.  The patient verbalized understanding of the proper use and possible adverse effects of minocycline.  All of the patient's questions and concerns were addressed.
High Dose Vitamin A Pregnancy And Lactation Text: High dose vitamin A therapy is contraindicated during pregnancy and breast feeding.
Cantharidin Pregnancy And Lactation Text: This medication has not been proven safe during pregnancy. It is unknown if this medication is excreted in breast milk.
Metronidazole Counseling:  I discussed with the patient the risks of metronidazole including but not limited to seizures, nausea/vomiting, a metallic taste in the mouth, nausea/vomiting and severe allergy.
Simponi Counseling:  I discussed with the patient the risks of golimumab including but not limited to myelosuppression, immunosuppression, autoimmune hepatitis, demyelinating diseases, lymphoma, and serious infections.  The patient understands that monitoring is required including a PPD at baseline and must alert us or the primary physician if symptoms of infection or other concerning signs are noted.
Opzelura Counseling:  I discussed with the patient the risks of Opzelura including but not limited to nasopharngitis, bronchitis, ear infection, eosinophila, hives, diarrhea, folliculitis, tonsillitis, and rhinorrhea.  Taken orally, this medication has been linked to serious infections; higher rate of mortality; malignancy and lymphoproliferative disorders; major adverse cardiovascular events; thrombosis; thrombocytopenia, anemia, and neutropenia; and lipid elevations.
Nsaids Counseling: NSAID Counseling: I discussed with the patient that NSAIDs should be taken with food. Prolonged use of NSAIDs can result in the development of stomach ulcers.  Patient advised to stop taking NSAIDs if abdominal pain occurs.  The patient verbalized understanding of the proper use and possible adverse effects of NSAIDs.  All of the patient's questions and concerns were addressed.
Gabapentin Pregnancy And Lactation Text: This medication is Pregnancy Category C and isn't considered safe during pregnancy. It is excreted in breast milk.
Hydroquinone Pregnancy And Lactation Text: This medication has not been assigned a Pregnancy Risk Category but animal studies failed to show danger with the topical medication. It is unknown if the medication is excreted in breast milk.
Cimzia Pregnancy And Lactation Text: This medication crosses the placenta but can be considered safe in certain situations. Cimzia may be excreted in breast milk.
Carac Pregnancy And Lactation Text: This medication is Pregnancy Category X and contraindicated in pregnancy and in women who may become pregnant. It is unknown if this medication is excreted in breast milk.
Solaraze Counseling:  I discussed with the patient the risks of Solaraze including but not limited to erythema, scaling, itching, weeping, crusting, and pain.
Albendazole Pregnancy And Lactation Text: This medication is Pregnancy Category C and it isn't known if it is safe during pregnancy. It is also excreted in breast milk.
Tranexamic Acid Pregnancy And Lactation Text: It is unknown if this medication is safe during pregnancy or breast feeding.
Odomzo Counseling- I discussed with the patient the risks of Odomzo including but not limited to nausea, vomiting, diarrhea, constipation, weight loss, changes in the sense of taste, decreased appetite, muscle spasms, and hair loss.  The patient verbalized understanding of the proper use and possible adverse effects of Odomzo.  All of the patient's questions and concerns were addressed.
Terbinafine Pregnancy And Lactation Text: This medication is Pregnancy Category B and is considered safe during pregnancy. It is also excreted in breast milk and breast feeding isn't recommended.
Birth Control Pills Pregnancy And Lactation Text: This medication should be avoided if pregnant and for the first 30 days post-partum.
5-Fu Counseling: 5-Fluorouracil Counseling:  I discussed with the patient the risks of 5-fluorouracil including but not limited to erythema, scaling, itching, weeping, crusting, and pain.
Cyclosporine Counseling:  I discussed with the patient the risks of cyclosporine including but not limited to hypertension, gingival hyperplasia,myelosuppression, immunosuppression, liver damage, kidney damage, neurotoxicity, lymphoma, and serious infections. The patient understands that monitoring is required including baseline blood pressure, CBC, CMP, lipid panel and uric acid, and then 1-2 times monthly CMP and blood pressure.
Bactrim Pregnancy And Lactation Text: This medication is Pregnancy Category D and is known to cause fetal risk.  It is also excreted in breast milk.
Xelrossz Pregnancy And Lactation Text: This medication is Pregnancy Category D and is not considered safe during pregnancy.  The risk during breast feeding is also uncertain.
Propranolol Counseling:  I discussed with the patient the risks of propranolol including but not limited to low heart rate, low blood pressure, low blood sugar, restlessness and increased cold sensitivity. They should call the office if they experience any of these side effects.
Cosentyx Counseling:  I discussed with the patient the risks of Cosentyx including but not limited to worsening of Crohn's disease, immunosuppression, allergic reactions and infections.  The patient understands that monitoring is required including a PPD at baseline and must alert us or the primary physician if symptoms of infection or other concerning signs are noted.
Fluconazole Counseling:  Patient counseled regarding adverse effects of fluconazole including but not limited to headache, diarrhea, nausea, upset stomach, liver function test abnormalities, taste disturbance, and stomach pain.  There is a rare possibility of liver failure that can occur when taking fluconazole.  The patient understands that monitoring of LFTs and kidney function test may be required, especially at baseline. The patient verbalized understanding of the proper use and possible adverse effects of fluconazole.  All of the patient's questions and concerns were addressed.
Topical Ketoconazole Counseling: Patient counseled that this medication may cause skin irritation or allergic reactions.  In the event of skin irritation, the patient was advised to reduce the amount of the drug applied or use it less frequently.   The patient verbalized understanding of the proper use and possible adverse effects of ketoconazole.  All of the patient's questions and concerns were addressed.
Zyclara Counseling:  I discussed with the patient the risks of imiquimod including but not limited to erythema, scaling, itching, weeping, crusting, and pain.  Patient understands that the inflammatory response to imiquimod is variable from person to person and was educated regarded proper titration schedule.  If flu-like symptoms develop, patient knows to discontinue the medication and contact us.
Litfulo Pregnancy And Lactation Text: Based on animal studies, Lifulo may cause embryo-fetal harm when administered to pregnant women.  The medication should not be used in pregnancy.  Breastfeeding is not recommended during treatment.
Nsaids Pregnancy And Lactation Text: These medications are considered safe up to 30 weeks gestation. It is excreted in breast milk.
Opzelura Pregnancy And Lactation Text: There is insufficient data to evaluate drug-associated risk for major birth defects, miscarriage, or other adverse maternal or fetal outcomes.  There is a pregnancy registry that monitors pregnancy outcomes in pregnant persons exposed to the medication during pregnancy.  It is unknown if this medication is excreted in breast milk.  Do not breastfeed during treatment and for about 4 weeks after the last dose.
Glycopyrrolate Counseling:  I discussed with the patient the risks of glycopyrrolate including but not limited to skin rash, drowsiness, dry mouth, difficulty urinating, and blurred vision.
Tremfya Counseling: I discussed with the patient the risks of guselkumab including but not limited to immunosuppression, serious infections, and drug reactions.  The patient understands that monitoring is required including a PPD at baseline and must alert us or the primary physician if symptoms of infection or other concerning signs are noted.
Odomzo Pregnancy And Lactation Text: This medication is Pregnancy Category X and is absolutely contraindicated during pregnancy. It is unknown if it is excreted in breast milk.
Metronidazole Pregnancy And Lactation Text: This medication is Pregnancy Category B and considered safe during pregnancy.  It is also excreted in breast milk.
Imiquimod Counseling:  I discussed with the patient the risks of imiquimod including but not limited to erythema, scaling, itching, weeping, crusting, and pain.  Patient understands that the inflammatory response to imiquimod is variable from person to person and was educated regarded proper titration schedule.  If flu-like symptoms develop, patient knows to discontinue the medication and contact us.
Ivermectin Counseling:  Patient instructed to take medication on an empty stomach with a full glass of water.  Patient informed of potential adverse effects including but not limited to nausea, diarrhea, dizziness, itching, and swelling of the extremities or lymph nodes.  The patient verbalized understanding of the proper use and possible adverse effects of ivermectin.  All of the patient's questions and concerns were addressed.
Ilumya Counseling: I discussed with the patient the risks of tildrakizumab including but not limited to immunosuppression, malignancy, posterior leukoencephalopathy syndrome, and serious infections.  The patient understands that monitoring is required including a PPD at baseline and must alert us or the primary physician if symptoms of infection or other concerning signs are noted.
Cephalexin Counseling: I counseled the patient regarding use of cephalexin as an antibiotic for prophylactic and/or therapeutic purposes. Cephalexin (commonly prescribed under brand name Keflex) is a cephalosporin antibiotic which is active against numerous classes of bacteria, including most skin bacteria. Side effects may include nausea, diarrhea, gastrointestinal upset, rash, hives, yeast infections, and in rare cases, hepatitis, kidney disease, seizures, fever, confusion, neurologic symptoms, and others. Patients with severe allergies to penicillin medications are cautioned that there is about a 10% incidence of cross-reactivity with cephalosporins. When possible, patients with penicillin allergies should use alternatives to cephalosporins for antibiotic therapy.
Spironolactone Counseling: Patient advised regarding risks of diarrhea, abdominal pain, hyperkalemia, birth defects (for female patients), liver toxicity and renal toxicity. The patient may need blood work to monitor liver and kidney function and potassium levels while on therapy. The patient verbalized understanding of the proper use and possible adverse effects of spironolactone.  All of the patient's questions and concerns were addressed.
Solaraze Pregnancy And Lactation Text: This medication is Pregnancy Category B and is considered safe. There is some data to suggest avoiding during the third trimester. It is unknown if this medication is excreted in breast milk.
Valtrex Counseling: I discussed with the patient the risks of valacyclovir including but not limited to kidney damage, nausea, vomiting and severe allergy.  The patient understands that if the infection seems to be worsening or is not improving, they are to call.
Winlevi Counseling:  I discussed with the patient the risks of topical clascoterone including but not limited to erythema, scaling, itching, and stinging. Patient voiced their understanding.
Glycopyrrolate Pregnancy And Lactation Text: This medication is Pregnancy Category B and is considered safe during pregnancy. It is unknown if it is excreted breast milk.
Picato Counseling:  I discussed with the patient the risks of Picato including but not limited to erythema, scaling, itching, weeping, crusting, and pain.
Quinolones Counseling:  I discussed with the patient the risks of fluoroquinolones including but not limited to GI upset, allergic reaction, drug rash, diarrhea, dizziness, photosensitivity, yeast infections, liver function test abnormalities, tendonitis/tendon rupture.
Propranolol Pregnancy And Lactation Text: This medication is Pregnancy Category C and it isn't known if it is safe during pregnancy. It is excreted in breast milk.
Olanzapine Counseling- I discussed with the patient the common side effects of olanzapine including but are not limited to: lack of energy, dry mouth, increased appetite, sleepiness, tremor, constipation, dizziness, changes in behavior, or restlessness.  Explained that teenagers are more likely to experience headaches, abdominal pain, pain in the arms or legs, tiredness, and sleepiness.  Serious side effects include but are not limited: increased risk of death in elderly patients who are confused, have memory loss, or dementia-related psychosis; hyperglycemia; increased cholesterol and triglycerides; and weight gain.
Olumiant Counseling: I discussed with the patient the risks of Olumiant therapy including but not limited to upper respiratory tract infections, shingles, cold sores, and nausea. Live vaccines should be avoided.  This medication has been linked to serious infections; higher rate of mortality; malignancy and lymphoproliferative disorders; major adverse cardiovascular events; thrombosis; gastrointestinal perforations; neutropenia; lymphopenia; anemia; liver enzyme elevations; and lipid elevations.
Cyclosporine Pregnancy And Lactation Text: This medication is Pregnancy Category C and it isn't know if it is safe during pregnancy. This medication is excreted in breast milk.
Skyrizi Counseling: I discussed with the patient the risks of risankizumab-rzaa including but not limited to immunosuppression, and serious infections.  The patient understands that monitoring is required including a PPD at baseline and must alert us or the primary physician if symptoms of infection or other concerning signs are noted.
Detail Level: Simple
Fluconazole Pregnancy And Lactation Text: This medication is Pregnancy Category C and it isn't know if it is safe during pregnancy. It is also excreted in breast milk.
Zyclara Pregnancy And Lactation Text: This medication is Pregnancy Category C. It is unknown if this medication is excreted in breast milk.
Acitretin Counseling:  I discussed with the patient the risks of acitretin including but not limited to hair loss, dry lips/skin/eyes, liver damage, hyperlipidemia, depression/suicidal ideation, photosensitivity.  Serious rare side effects can include but are not limited to pancreatitis, pseudotumor cerebri, bony changes, clot formation/stroke/heart attack.  Patient understands that alcohol is contraindicated since it can result in liver toxicity and significantly prolong the elimination of the drug by many years.
Cimetidine Counseling:  I discussed with the patient the risks of Cimetidine including but not limited to gynecomastia, headache, diarrhea, nausea, drowsiness, arrhythmias, pancreatitis, skin rashes, psychosis, bone marrow suppression and kidney toxicity.
Soolantra Counseling: I discussed with the patients the risks of topial Soolantra. This is a medicine which decreases the number of mites and inflammation in the skin. You experience burning, stinging, eye irritation or allergic reactions.  Please call our office if you develop any problems from using this medication.
Cephalexin Pregnancy And Lactation Text: This medication is Pregnancy Category B and considered safe during pregnancy.  It is also excreted in breast milk but can be used safely for shorter doses.
Valtrex Pregnancy And Lactation Text: this medication is Pregnancy Category B and is considered safe during pregnancy. This medication is not directly found in breast milk but it's metabolite acyclovir is present.
Aklief counseling:  Patient advised to apply a pea-sized amount only at bedtime and wait 30 minutes after washing their face before applying.  If too drying, patient may add a non-comedogenic moisturizer.  The most commonly reported side effects including irritation, redness, scaling, dryness, stinging, burning, itching, and increased risk of sunburn.  The patient verbalized understanding of the proper use and possible adverse effects of retinoids.  All of the patient's questions and concerns were addressed.
Olumiant Pregnancy And Lactation Text: Based on animal studies, Olumiant may cause embryo-fetal harm when administered to pregnant women.  The medication should not be used in pregnancy.  Breastfeeding is not recommended during treatment.
Spironolactone Pregnancy And Lactation Text: This medication can cause feminization of the male fetus and should be avoided during pregnancy. The active metabolite is also found in breast milk.
Methotrexate Counseling:  Patient counseled regarding adverse effects of methotrexate including but not limited to nausea, vomiting, abnormalities in liver function tests. Patients may develop mouth sores, rash, diarrhea, and abnormalities in blood counts. The patient understands that monitoring is required including LFT's and blood counts.  There is a rare possibility of scarring of the liver and lung problems that can occur when taking methotrexate. Persistent nausea, loss of appetite, pale stools, dark urine, cough, and shortness of breath should be reported immediately. Patient advised to discontinue methotrexate treatment at least three months before attempting to become pregnant.  I discussed the need for folate supplements while taking methotrexate.  These supplements can decrease side effects during methotrexate treatment. The patient verbalized understanding of the proper use and possible adverse effects of methotrexate.  All of the patient's questions and concerns were addressed.
Drysol Counseling:  I discussed with the patient the risks of drysol/aluminum chloride including but not limited to skin rash, itching, irritation, burning.
Hydroxychloroquine Counseling:  I discussed with the patient that a baseline ophthalmologic exam is needed at the start of therapy and every year thereafter while on therapy. A CBC may also be warranted for monitoring.  The side effects of this medication were discussed with the patient, including but not limited to agranulocytosis, aplastic anemia, seizures, rashes, retinopathy, and liver toxicity. Patient instructed to call the office should any adverse effect occur.  The patient verbalized understanding of the proper use and possible adverse effects of Plaquenil.  All the patient's questions and concerns were addressed.
Dutasteride Male Counseling: Dustasteride Counseling:  I discussed with the patient the risks of use of dutasteride including but not limited to decreased libido, decreased ejaculate volume, and gynecomastia. Women who can become pregnant should not handle medication.  All of the patient's questions and concerns were addressed.
Olanzapine Pregnancy And Lactation Text: This medication is pregnancy category C.   There are no adequate and well controlled trials with olanzapine in pregnant females.  Olanzapine should be used during pregnancy only if the potential benefit justifies the potential risk to the fetus.   In a study in lactating healthy women, olanzapine was excreted in breast milk.  It is recommended that women taking olanzapine should not breast feed.
Griseofulvin Counseling:  I discussed with the patient the risks of griseofulvin including but not limited to photosensitivity, cytopenia, liver damage, nausea/vomiting and severe allergy.  The patient understands that this medication is best absorbed when taken with a fatty meal (e.g., ice cream or french fries).
Dupixent Counseling: I discussed with the patient the risks of dupilumab including but not limited to eye infection and irritation, cold sores, injection site reactions, worsening of asthma, allergic reactions and increased risk of parasitic infection.  Live vaccines should be avoided while taking dupilumab. Dupilumab will also interact with certain medications such as warfarin and cyclosporine. The patient understands that monitoring is required and they must alert us or the primary physician if symptoms of infection or other concerning signs are noted.
SSKI Counseling:  I discussed with the patient the risks of SSKI including but not limited to thyroid abnormalities, metallic taste, GI upset, fever, headache, acne, arthralgias, paraesthesias, lymphadenopathy, easy bleeding, arrhythmias, and allergic reaction.
Use Enhanced Medication Counseling?: No
Klisyri Counseling:  I discussed with the patient the risks of Klisyri including but not limited to erythema, scaling, itching, weeping, crusting, and pain.
Aklief Pregnancy And Lactation Text: It is unknown if this medication is safe to use during pregnancy.  It is unknown if this medication is excreted in breast milk.  Breastfeeding women should use the topical cream on the smallest area of the skin for the shortest time needed while breastfeeding.  Do not apply to nipple and areola.
Soolantra Pregnancy And Lactation Text: This medication is Pregnancy Category C. This medication is considered safe during breast feeding.
Xolair Counseling:  Patient informed of potential adverse effects including but not limited to fever, muscle aches, rash and allergic reactions.  The patient verbalized understanding of the proper use and possible adverse effects of Xolair.  All of the patient's questions and concerns were addressed.
Acitretin Pregnancy And Lactation Text: This medication is Pregnancy Category X and should not be given to women who are pregnant or may become pregnant in the future. This medication is excreted in breast milk.
Topical Metronidazole Counseling: Metronidazole is a topical antibiotic medication. You may experience burning, stinging, redness, or allergic reactions.  Please call our office if you develop any problems from using this medication.
Methotrexate Pregnancy And Lactation Text: This medication is Pregnancy Category X and is known to cause fetal harm. This medication is excreted in breast milk.
Opioid Counseling: I discussed with the patient the potential side effects of opioids including but not limited to addiction, altered mental status, and depression. I stressed avoiding alcohol, benzodiazepines, muscle relaxants and sleep aids unless specifically okayed by a physician. The patient verbalized understanding of the proper use and possible adverse effects of opioids. All of the patient's questions and concerns were addressed. They were instructed to flush the remaining pills down the toilet if they did not need them for pain.
Clindamycin Counseling: I counseled the patient regarding use of clindamycin as an antibiotic for prophylactic and/or therapeutic purposes. Clindamycin is active against numerous classes of bacteria, including skin bacteria. Side effects may include nausea, diarrhea, gastrointestinal upset, rash, hives, yeast infections, and in rare cases, colitis.
Rifampin Counseling: I discussed with the patient the risks of rifampin including but not limited to liver damage, kidney damage, red-orange body fluids, nausea/vomiting and severe allergy.
Drysol Pregnancy And Lactation Text: This medication is considered safe during pregnancy and breast feeding.
Infliximab Counseling:  I discussed with the patient the risks of infliximab including but not limited to myelosuppression, immunosuppression, autoimmune hepatitis, demyelinating diseases, lymphoma, and serious infections.  The patient understands that monitoring is required including a PPD at baseline and must alert us or the primary physician if symptoms of infection or other concerning signs are noted.
Colchicine Counseling:  Patient counseled regarding adverse effects including but not limited to stomach upset (nausea, vomiting, stomach pain, or diarrhea).  Patient instructed to limit alcohol consumption while taking this medication.  Colchicine may reduce blood counts especially with prolonged use.  The patient understands that monitoring of kidney function and blood counts may be required, especially at baseline. The patient verbalized understanding of the proper use and possible adverse effects of colchicine.  All of the patient's questions and concerns were addressed.
Dupixent Pregnancy And Lactation Text: This medication likely crosses the placenta but the risk for the fetus is uncertain. This medication is excreted in breast milk.
Sski Pregnancy And Lactation Text: This medication is Pregnancy Category D and isn't considered safe during pregnancy. It is excreted in breast milk.
Dutasteride Female Counseling: Dutasteride Counseling:  I discussed with the patient the risks of use of dutasteride including but not limited to decreased libido and sexual dysfunction. Explained the teratogenic nature of the medication and stressed the importance of not getting pregnant during treatment. All of the patient's questions and concerns were addressed.
Griseofulvin Pregnancy And Lactation Text: This medication is Pregnancy Category X and is known to cause serious birth defects. It is unknown if this medication is excreted in breast milk but breast feeding should be avoided.
Protopic Counseling: Patient may experience a mild burning sensation during topical application. Protopic is not approved in children less than 2 years of age. There have been case reports of hematologic and skin malignancies in patients using topical calcineurin inhibitors although causality is questionable.
Azelaic Acid Counseling: Patient counseled that medicine may cause skin irritation and to avoid applying near the eyes.  In the event of skin irritation, the patient was advised to reduce the amount of the drug applied or use it less frequently.   The patient verbalized understanding of the proper use and possible adverse effects of azelaic acid.  All of the patient's questions and concerns were addressed.
Topical Metronidazole Pregnancy And Lactation Text: This medication is Pregnancy Category B and considered safe during pregnancy.  It is also considered safe to use while breastfeeding.
Bexarotene Counseling:  I discussed with the patient the risks of bexarotene including but not limited to hair loss, dry lips/skin/eyes, liver abnormalities, hyperlipidemia, pancreatitis, depression/suicidal ideation, photosensitivity, drug rash/allergic reactions, hypothyroidism, anemia, leukopenia, infection, cataracts, and teratogenicity.  Patient understands that they will need regular blood tests to check lipid profile, liver function tests, white blood cell count, thyroid function tests and pregnancy test if applicable.
Hydroxychloroquine Pregnancy And Lactation Text: This medication has been shown to cause fetal harm but it isn't assigned a Pregnancy Risk Category. There are small amounts excreted in breast milk.
Oral Minoxidil Counseling- I discussed with the patient the risks of oral minoxidil including but not limited to shortness of breath, swelling of the feet or ankles, dizziness, lightheadedness, unwanted hair growth and allergic reaction.  The patient verbalized understanding of the proper use and possible adverse effects of oral minoxidil.  All of the patient's questions and concerns were addressed.
Doxepin Counseling:  Patient advised that the medication is sedating and not to drive a car after taking this medication. Patient informed of potential adverse effects including but not limited to dry mouth, urinary retention, and blurry vision.  The patient verbalized understanding of the proper use and possible adverse effects of doxepin.  All of the patient's questions and concerns were addressed.
Azathioprine Counseling:  I discussed with the patient the risks of azathioprine including but not limited to myelosuppression, immunosuppression, hepatotoxicity, lymphoma, and infections.  The patient understands that monitoring is required including baseline LFTs, Creatinine, possible TPMP genotyping and weekly CBCs for the first month and then every 2 weeks thereafter.  The patient verbalized understanding of the proper use and possible adverse effects of azathioprine.  All of the patient's questions and concerns were addressed.
Klisyri Pregnancy And Lactation Text: It is unknown if this medication can harm a developing fetus or if it is excreted in breast milk.
Clindamycin Pregnancy And Lactation Text: This medication can be used in pregnancy if certain situations. Clindamycin is also present in breast milk.
Stelara Counseling:  I discussed with the patient the risks of ustekinumab including but not limited to immunosuppression, malignancy, posterior leukoencephalopathy syndrome, and serious infections.  The patient understands that monitoring is required including a PPD at baseline and must alert us or the primary physician if symptoms of infection or other concerning signs are noted.
Adbry Counseling: I discussed with the patient the risks of tralokinumab including but not limited to eye infection and irritation, cold sores, injection site reactions, worsening of asthma, allergic reactions and increased risk of parasitic infection.  Live vaccines should be avoided while taking tralokinumab. The patient understands that monitoring is required and they must alert us or the primary physician if symptoms of infection or other concerning signs are noted.
Topical Retinoid counseling:  Patient advised to apply a pea-sized amount only at bedtime and wait 30 minutes after washing their face before applying.  If too drying, patient may add a non-comedogenic moisturizer. The patient verbalized understanding of the proper use and possible adverse effects of retinoids.  All of the patient's questions and concerns were addressed.
Xolair Pregnancy And Lactation Text: This medication is Pregnancy Category B and is considered safe during pregnancy. This medication is excreted in breast milk.
Protopic Pregnancy And Lactation Text: This medication is Pregnancy Category C. It is unknown if this medication is excreted in breast milk when applied topically.
Elidel Counseling: Patient may experience a mild burning sensation during topical application. Elidel is not approved in children less than 2 years of age. There have been case reports of hematologic and skin malignancies in patients using topical calcineurin inhibitors although causality is questionable.
Rifampin Pregnancy And Lactation Text: This medication is Pregnancy Category C and it isn't know if it is safe during pregnancy. It is also excreted in breast milk and should not be used if you are breast feeding.
Prednisone Counseling:  I discussed with the patient the risks of prolonged use of prednisone including but not limited to weight gain, insomnia, osteoporosis, mood changes, diabetes, susceptibility to infection, glaucoma and high blood pressure.  In cases where prednisone use is prolonged, patients should be monitored with blood pressure checks, serum glucose levels and an eye exam.  Additionally, the patient may need to be placed on GI prophylaxis, PCP prophylaxis, and calcium and vitamin D supplementation and/or a bisphosphonate.  The patient verbalized understanding of the proper use and the possible adverse effects of prednisone.  All of the patient's questions and concerns were addressed.
Opioid Pregnancy And Lactation Text: These medications can lead to premature delivery and should be avoided during pregnancy. These medications are also present in breast milk in small amounts.
Thalidomide Counseling: I discussed with the patient the risks of thalidomide including but not limited to birth defects, anxiety, weakness, chest pain, dizziness, cough and severe allergy.
Bexarotene Pregnancy And Lactation Text: This medication is Pregnancy Category X and should not be given to women who are pregnant or may become pregnant. This medication should not be used if you are breast feeding.
Oral Minoxidil Pregnancy And Lactation Text: This medication should only be used when clearly needed if you are pregnant, attempting to become pregnant or breast feeding.
Itraconazole Counseling:  I discussed with the patient the risks of itraconazole including but not limited to liver damage, nausea/vomiting, neuropathy, and severe allergy.  The patient understands that this medication is best absorbed when taken with acidic beverages such as non-diet cola or ginger ale.  The patient understands that monitoring is required including baseline LFTs and repeat LFTs at intervals.  The patient understands that they are to contact us or the primary physician if concerning signs are noted.
Winlevi Pregnancy And Lactation Text: This medication is considered safe during pregnancy and breastfeeding.
Rinvoq Counseling: I discussed with the patient the risks of Rinvoq therapy including but not limited to upper respiratory tract infections, shingles, cold sores, bronchitis, nausea, cough, fever, acne, and headache. Live vaccines should be avoided.  This medication has been linked to serious infections; higher rate of mortality; malignancy and lymphoproliferative disorders; major adverse cardiovascular events; thrombosis; thrombocytopenia, anemia, and neutropenia; lipid elevations; liver enzyme elevations; and gastrointestinal perforations.
Topical Steroids Counseling: I discussed with the patient that prolonged use of topical steroids can result in the increased appearance of superficial blood vessels (telangiectasias), lightening (hypopigmentation) and thinning of the skin (atrophy).  Patient understands to avoid using high potency steroids in skin folds, the groin or the face.  The patient verbalized understanding of the proper use and possible adverse effects of topical steroids.  All of the patient's questions and concerns were addressed.
Dutasteride Pregnancy And Lactation Text: This medication is absolutely contraindicated in women, especially during pregnancy and breast feeding. Feminization of male fetuses is possible if taking while pregnant.
Enbrel Counseling:  I discussed with the patient the risks of etanercept including but not limited to myelosuppression, immunosuppression, autoimmune hepatitis, demyelinating diseases, lymphoma, and infections.  The patient understands that monitoring is required including a PPD at baseline and must alert us or the primary physician if symptoms of infection or other concerning signs are noted.
Azathioprine Pregnancy And Lactation Text: This medication is Pregnancy Category D and isn't considered safe during pregnancy. It is unknown if this medication is excreted in breast milk.
Doxepin Pregnancy And Lactation Text: This medication is Pregnancy Category C and it isn't known if it is safe during pregnancy. It is also excreted in breast milk and breast feeding isn't recommended.
Doxycycline Counseling:  Patient counseled regarding possible photosensitivity and increased risk for sunburn.  Patient instructed to avoid sunlight, if possible.  When exposed to sunlight, patients should wear protective clothing, sunglasses, and sunscreen.  The patient was instructed to call the office immediately if the following severe adverse effects occur:  hearing changes, easy bruising/bleeding, severe headache, or vision changes.  The patient verbalized understanding of the proper use and possible adverse effects of doxycycline.  All of the patient's questions and concerns were addressed.
Minoxidil Counseling: Minoxidil is a topical medication which can increase blood flow where it is applied. It is uncertain how this medication increases hair growth. Side effects are uncommon and include stinging and allergic reactions.
Low Dose Naltrexone Counseling- I discussed with the patient the potential risks and side effects of low dose naltrexone including but not limited to: more vivid dreams, headaches, nausea, vomiting, abdominal pain, fatigue, dizziness, and anxiety.
Adbry Pregnancy And Lactation Text: It is unknown if this medication will adversely affect pregnancy or breast feeding.
Dapsone Counseling: I discussed with the patient the risks of dapsone including but not limited to hemolytic anemia, agranulocytosis, rashes, methemoglobinemia, kidney failure, peripheral neuropathy, headaches, GI upset, and liver toxicity.  Patients who start dapsone require monitoring including baseline LFTs and weekly CBCs for the first month, then every month thereafter.  The patient verbalized understanding of the proper use and possible adverse effects of dapsone.  All of the patient's questions and concerns were addressed.
Rituxan Counseling:  I discussed with the patient the risks of Rituxan infusions. Side effects can include infusion reactions, severe drug rashes including mucocutaneous reactions, reactivation of latent hepatitis and other infections and rarely progressive multifocal leukoencephalopathy.  All of the patient's questions and concerns were addressed.
Qbrexza Counseling:  I discussed with the patient the risks of Qbrexza including but not limited to headache, mydriasis, blurred vision, dry eyes, nasal dryness, dry mouth, dry throat, dry skin, urinary hesitation, and constipation.  Local skin reactions including erythema, burning, stinging, and itching can also occur.
Cellcept Counseling:  I discussed with the patient the risks of mycophenolate mofetil including but not limited to infection/immunosuppression, GI upset, hypokalemia, hypercholesterolemia, bone marrow suppression, lymphoproliferative disorders, malignancy, GI ulceration/bleed/perforation, colitis, interstitial lung disease, kidney failure, progressive multifocal leukoencephalopathy, and birth defects.  The patient understands that monitoring is required including a baseline creatinine and regular CBC testing. In addition, patient must alert us immediately if symptoms of infection or other concerning signs are noted.
Rinvoq Pregnancy And Lactation Text: Based on animal studies, Rinvoq may cause embryo-fetal harm when administered to pregnant women.  The medication should not be used in pregnancy.  Breastfeeding is not recommended during treatment and for 6 days after the last dose.
Hydroxyzine Counseling: Patient advised that the medication is sedating and not to drive a car after taking this medication.  Patient informed of potential adverse effects including but not limited to dry mouth, urinary retention, and blurry vision.  The patient verbalized understanding of the proper use and possible adverse effects of hydroxyzine.  All of the patient's questions and concerns were addressed.
Sarecycline Counseling: Patient advised regarding possible photosensitivity and discoloration of the teeth, skin, lips, tongue and gums.  Patient instructed to avoid sunlight, if possible.  When exposed to sunlight, patients should wear protective clothing, sunglasses, and sunscreen.  The patient was instructed to call the office immediately if the following severe adverse effects occur:  hearing changes, easy bruising/bleeding, severe headache, or vision changes.  The patient verbalized understanding of the proper use and possible adverse effects of sarecycline.  All of the patient's questions and concerns were addressed.
Erivedge Counseling- I discussed with the patient the risks of Erivedge including but not limited to nausea, vomiting, diarrhea, constipation, weight loss, changes in the sense of taste, decreased appetite, muscle spasms, and hair loss.  The patient verbalized understanding of the proper use and possible adverse effects of Erivedge.  All of the patient's questions and concerns were addressed.
Finasteride Male Counseling: Finasteride Counseling:  I discussed with the patient the risks of use of finasteride including but not limited to decreased libido, decreased ejaculate volume, gynecomastia, and depression. Women should not handle medication.  All of the patient's questions and concerns were addressed.
Taltz Counseling: I discussed with the patient the risks of ixekizumab including but not limited to immunosuppression, serious infections, worsening of inflammatory bowel disease and drug reactions.  The patient understands that monitoring is required including a PPD at baseline and must alert us or the primary physician if symptoms of infection or other concerning signs are noted.
Otezla Counseling: The side effects of Otezla were discussed with the patient, including but not limited to worsening or new depression, weight loss, diarrhea, nausea, upper respiratory tract infection, and headache. Patient instructed to call the office should any adverse effect occur.  The patient verbalized understanding of the proper use and possible adverse effects of Otezla.  All the patient's questions and concerns were addressed.
VTAMA Counseling: I discussed with the patient that VTAMA is not for use in the eyes, mouth or mouth. They should call the office if they develop any signs of allergic reactions to VTAMA. The patient verbalized understanding of the proper use and possible adverse effects of VTAMA.  All of the patient's questions and concerns were addressed.
Bimzelx Counseling:  I discussed with the patient the risks of Bimzelx including but not limited to depression, immunosuppression, allergic reactions and infections.  The patient understands that monitoring is required including a PPD at baseline and must alert us or the primary physician if symptoms of infection or other concerning signs are noted.
Arava Counseling:  Patient counseled regarding adverse effects of Arava including but not limited to nausea, vomiting, abnormalities in liver function tests. Patients may develop mouth sores, rash, diarrhea, and abnormalities in blood counts. The patient understands that monitoring is required including LFTs and blood counts.  There is a rare possibility of scarring of the liver and lung problems that can occur when taking methotrexate. Persistent nausea, loss of appetite, pale stools, dark urine, cough, and shortness of breath should be reported immediately. Patient advised to discontinue Arava treatment and consult with a physician prior to attempting conception. The patient will have to undergo a treatment to eliminate Arava from the body prior to conception.
Benzoyl Peroxide Counseling: Patient counseled that medicine may cause skin irritation and bleach clothing.  In the event of skin irritation, the patient was advised to reduce the amount of the drug applied or use it less frequently.   The patient verbalized understanding of the proper use and possible adverse effects of benzoyl peroxide.  All of the patient's questions and concerns were addressed.
Topical Steroids Applications Pregnancy And Lactation Text: Most topical steroids are considered safe to use during pregnancy and lactation.  Any topical steroid applied to the breast or nipple should be washed off before breastfeeding.
Low Dose Naltrexone Pregnancy And Lactation Text: Naltrexone is pregnancy category C.  There have been no adequate and well-controlled studies in pregnant women.  It should be used in pregnancy only if the potential benefit justifies the potential risk to the fetus.   Limited data indicates that naltrexone is minimally excreted into breastmilk.
Isotretinoin Counseling: Patient should get monthly blood tests, not donate blood, not drive at night if vision affected, not share medication, and not undergo elective surgery for 6 months after tx completed. Side effects reviewed, pt to contact office should one occur.
Tazorac Counseling:  Patient advised that medication is irritating and drying.  Patient may need to apply sparingly and wash off after an hour before eventually leaving it on overnight.  The patient verbalized understanding of the proper use and possible adverse effects of tazorac.  All of the patient's questions and concerns were addressed.
Eucrisa Counseling: Patient may experience a mild burning sensation during topical application. Eucrisa is not approved in children less than 3 months of age.
Doxycycline Pregnancy And Lactation Text: This medication is Pregnancy Category D and not consider safe during pregnancy. It is also excreted in breast milk but is considered safe for shorter treatment courses.
Azithromycin Counseling:  I discussed with the patient the risks of azithromycin including but not limited to GI upset, allergic reaction, drug rash, diarrhea, and yeast infections.
Finasteride Female Counseling: Finasteride Counseling:  I discussed with the patient the risks of use of finasteride including but not limited to decreased libido and sexual dysfunction. Explained the teratogenic nature of the medication and stressed the importance of not getting pregnant during treatment. All of the patient's questions and concerns were addressed.
Dapsone Pregnancy And Lactation Text: This medication is Pregnancy Category C and is not considered safe during pregnancy or breast feeding.
Rituxan Pregnancy And Lactation Text: This medication is Pregnancy Category C and it isn't know if it is safe during pregnancy. It is unknown if this medication is excreted in breast milk but similar antibodies are known to be excreted.
Ketoconazole Counseling:   Patient counseled regarding improving absorption with orange juice.  Adverse effects include but are not limited to breast enlargement, headache, diarrhea, nausea, upset stomach, liver function test abnormalities, taste disturbance, and stomach pain.  There is a rare possibility of liver failure that can occur when taking ketoconazole. The patient understands that monitoring of LFTs may be required, especially at baseline. The patient verbalized understanding of the proper use and possible adverse effects of ketoconazole.  All of the patient's questions and concerns were addressed.
Benzoyl Peroxide Pregnancy And Lactation Text: This medication is Pregnancy Category C. It is unknown if benzoyl peroxide is excreted in breast milk.
Topical Sulfur Applications Counseling: Topical Sulfur Counseling: Patient counseled that this medication may cause skin irritation or allergic reactions.  In the event of skin irritation, the patient was advised to reduce the amount of the drug applied or use it less frequently.   The patient verbalized understanding of the proper use and possible adverse effects of topical sulfur application.  All of the patient's questions and concerns were addressed.
Mirvaso Counseling: Mirvaso is a topical medication which can decrease superficial blood flow where applied. Side effects are uncommon and include stinging, redness and allergic reactions.
Isotretinoin Pregnancy And Lactation Text: This medication is Pregnancy Category X and is considered extremely dangerous during pregnancy. It is unknown if it is excreted in breast milk.
Hydroxyzine Pregnancy And Lactation Text: This medication is not safe during pregnancy and should not be taken. It is also excreted in breast milk and breast feeding isn't recommended.
Otezla Pregnancy And Lactation Text: This medication is Pregnancy Category C and it isn't known if it is safe during pregnancy. It is unknown if it is excreted in breast milk.
Niacinamide Counseling: I recommended taking niacin or niacinamide, also know as vitamin B3, twice daily. Recent evidence suggests that taking vitamin B3 (500 mg twice daily) can reduce the risk of actinic keratoses and non-melanoma skin cancers. Side effects of vitamin B3 include flushing and headache.
Sotyktu Counseling:  I discussed the most common side effects of Sotyktu including: common cold, sore throat, sinus infections, cold sores, canker sores, folliculitis, and acne.? I also discussed more serious side effects of Sotyktu including but not limited to: serious allergic reactions; increased risk for infections such as TB; cancers such as lymphomas; rhabdomyolysis and elevated CPK; and elevated triglycerides and liver enzymes.?
Qbrexza Pregnancy And Lactation Text: There is no available data on Qbrexza use in pregnant women.  There is no available data on Qbrexza use in lactation.
Humira Counseling:  I discussed with the patient the risks of adalimumab including but not limited to myelosuppression, immunosuppression, autoimmune hepatitis, demyelinating diseases, lymphoma, and serious infections.  The patient understands that monitoring is required including a PPD at baseline and must alert us or the primary physician if symptoms of infection or other concerning signs are noted.
Tranexamic Acid Counseling:  Patient advised of the small risk of bleeding problems with tranexamic acid. They were also instructed to call if they developed any nausea, vomiting or diarrhea. All of the patient's questions and concerns were addressed.
Erythromycin Counseling:  I discussed with the patient the risks of erythromycin including but not limited to GI upset, allergic reaction, drug rash, diarrhea, increase in liver enzymes, and yeast infections.
Tazorac Pregnancy And Lactation Text: This medication is not safe during pregnancy. It is unknown if this medication is excreted in breast milk.
Bimzelx Pregnancy And Lactation Text: This medication crosses the placenta and the safety is uncertain during pregnancy. It is unknown if this medication is present in breast milk.
Cibinqo Counseling: I discussed with the patient the risks of Cibinqo therapy including but not limited to common cold, nausea, headache, cold sores, increased blood CPK levels, dizziness, UTIs, fatigue, acne, and vomitting. Live vaccines should be avoided.  This medication has been linked to serious infections; higher rate of mortality; malignancy and lymphoproliferative disorders; major adverse cardiovascular events; thrombosis; thrombocytopenia and lymphopenia; lipid elevations; and retinal detachment.
Azithromycin Pregnancy And Lactation Text: This medication is considered safe during pregnancy and is also secreted in breast milk.
Libtayo Counseling- I discussed with the patient the risks of Libtayo including but not limited to nausea, vomiting, diarrhea, and bone or muscle pain.  The patient verbalized understanding of the proper use and possible adverse effects of Libtayo.  All of the patient's questions and concerns were addressed.
Tetracycline Counseling: Patient counseled regarding possible photosensitivity and increased risk for sunburn.  Patient instructed to avoid sunlight, if possible.  When exposed to sunlight, patients should wear protective clothing, sunglasses, and sunscreen.  The patient was instructed to call the office immediately if the following severe adverse effects occur:  hearing changes, easy bruising/bleeding, severe headache, or vision changes.  The patient verbalized understanding of the proper use and possible adverse effects of tetracycline.  All of the patient's questions and concerns were addressed. Patient understands to avoid pregnancy while on therapy due to potential birth defects.
Siliq Counseling:  I discussed with the patient the risks of Siliq including but not limited to new or worsening depression, suicidal thoughts and behavior, immunosuppression, malignancy, posterior leukoencephalopathy syndrome, and serious infections.  The patient understands that monitoring is required including a PPD at baseline and must alert us or the primary physician if symptoms of infection or other concerning signs are noted. There is also a special program designed to monitor depression which is required with Siliq.

## 2024-07-16 ENCOUNTER — RX ONLY (OUTPATIENT)
Age: 54
Setting detail: RX ONLY
End: 2024-07-16

## 2024-07-16 RX ORDER — ADALIMUMAB 40MG/0.8ML
KIT SUBCUTANEOUS
Qty: 4 | Refills: 0 | Status: ERX

## 2024-07-18 ENCOUNTER — RX ONLY (OUTPATIENT)
Age: 54
Setting detail: RX ONLY
End: 2024-07-18

## 2024-07-18 RX ORDER — ADALIMUMAB 40MG/0.8ML
KIT SUBCUTANEOUS
Qty: 4 | Refills: 0 | Status: ERX

## (undated) DEVICE — SKIN PREP TRAY W/CHG: Brand: MEDLINE INDUSTRIES, INC.

## (undated) DEVICE — GAUZE,SPONGE,FLUFF,6"X6.75",STRL,5/TRAY: Brand: MEDLINE

## (undated) DEVICE — SOL IRRIGATION INJ NACL 0.9% 500ML BTL

## (undated) DEVICE — STANDARD (U) BLADE ASSEMBLY 6PK: Brand: MICROAIRE®

## (undated) DEVICE — MASTISOL ADHESIVE LIQ 2/3ML

## (undated) DEVICE — BLADE OPHTH 180DEG CUT SURF BLU STR SHRP DBL BVL GRINDLESS

## (undated) DEVICE — STERILE POLYISOPRENE POWDER-FREE SURGICAL GLOVES: Brand: PROTEXIS

## (undated) DEVICE — KIT,ANTI FOG,W/SPONGE & FLUID,SOFT PACK: Brand: MEDLINE

## (undated) DEVICE — BIPOLAR FORCEPS CORD: Brand: VALLEYLAB

## (undated) DEVICE — IMPERVIOUS SURGICAL GOWN, LG: Brand: CONVERTORS

## (undated) DEVICE — BANDAGE,GAUZE,BULKEE II,4.5"X4.1YD,STRL: Brand: MEDLINE

## (undated) DEVICE — STRIP,CLOSURE,WOUND,MEDI-STRIP,1/2X4: Brand: MEDLINE

## (undated) DEVICE — 1010 S-DRAPE TOWEL DRAPE 10/BX: Brand: STERI-DRAPE™

## (undated) DEVICE — SUT PROL 5-0 18IN P3 BLU --

## (undated) DEVICE — HAND I-LF: Brand: MEDLINE INDUSTRIES, INC.

## (undated) DEVICE — ZIMMER® STERILE DISPOSABLE TOURNIQUET CUFF WITH PROTECTIVE SLEEVE AND PLC, DUAL PORT, SINGLE BLADDER, 18 IN. (46 CM)

## (undated) DEVICE — DRAPE,REIN 53X77,STERILE: Brand: MEDLINE

## (undated) DEVICE — BANDAGE COMPR W3INXL5YD TAN POLY COHESIVE CARING SGL

## (undated) DEVICE — DRSG GZ OIL EMUL CURAD 3X3 --

## (undated) DEVICE — COVER LT HNDL PLAS RIG 1 PER PK

## (undated) DEVICE — TOWEL,OR,DSP,ST,BLUE,STD,2/PK,40PK/CS: Brand: MEDLINE